# Patient Record
Sex: FEMALE | Race: WHITE | URBAN - METROPOLITAN AREA
[De-identification: names, ages, dates, MRNs, and addresses within clinical notes are randomized per-mention and may not be internally consistent; named-entity substitution may affect disease eponyms.]

---

## 2017-02-01 ENCOUNTER — OFFICE VISIT (OUTPATIENT)
Dept: FAMILY MEDICINE CLINIC | Facility: CLINIC | Age: 52
End: 2017-02-01

## 2017-02-01 VITALS
OXYGEN SATURATION: 96 % | WEIGHT: 247.19 LBS | DIASTOLIC BLOOD PRESSURE: 90 MMHG | RESPIRATION RATE: 20 BRPM | HEART RATE: 100 BPM | SYSTOLIC BLOOD PRESSURE: 142 MMHG | TEMPERATURE: 99 F | HEIGHT: 63 IN | BODY MASS INDEX: 43.8 KG/M2

## 2017-02-01 DIAGNOSIS — J40 BRONCHITIS: Primary | ICD-10-CM

## 2017-02-01 PROBLEM — Z98.890 H/O KNEE SURGERY: Status: ACTIVE | Noted: 2017-02-01

## 2017-02-01 PROCEDURE — 99214 OFFICE O/P EST MOD 30 MIN: CPT | Performed by: NURSE PRACTITIONER

## 2017-02-01 RX ORDER — REPAGLINIDE 0.5 MG/1
0.5 TABLET ORAL
COMMUNITY
Start: 2013-12-04 | End: 2017-07-28

## 2017-02-01 RX ORDER — AMOXICILLIN AND CLAVULANATE POTASSIUM 875; 125 MG/1; MG/1
1 TABLET, FILM COATED ORAL 2 TIMES DAILY
Qty: 20 TABLET | Refills: 0 | Status: SHIPPED | OUTPATIENT
Start: 2017-02-01 | End: 2017-02-11

## 2017-02-01 RX ORDER — TAMOXIFEN CITRATE 20 MG/1
20 TABLET ORAL DAILY
COMMUNITY
Start: 2013-05-29 | End: 2018-06-11

## 2017-02-01 RX ORDER — LISINOPRIL 10 MG/1
10 TABLET ORAL DAILY
COMMUNITY
Start: 2010-11-19 | End: 2017-07-28

## 2017-02-01 RX ORDER — BLOOD-GLUCOSE METER
EACH MISCELLANEOUS
COMMUNITY
Start: 2016-08-15

## 2017-02-01 RX ORDER — BENZONATATE 200 MG/1
200 CAPSULE ORAL 3 TIMES DAILY PRN
Qty: 30 CAPSULE | Refills: 1 | Status: SHIPPED | OUTPATIENT
Start: 2017-02-01 | End: 2017-06-07 | Stop reason: ALTCHOICE

## 2017-02-01 NOTE — PATIENT INSTRUCTIONS
Rest, increase fluids, salt water gargles ,neti pot (use distilled water) or saline nasal spray, Robitussin-DM, Tylenol/Ibuprofen, follow up if symptoms persist or increase.

## 2017-02-01 NOTE — PROGRESS NOTES
CHIEF COMPLAINT:   Patient presents with:  Wheezing: constant since 12/1/16      HPI:   Lin Leary is a 49year old female who presents to clinic today with complaints of cough- wheeze- since beginning of December   Mom was in the hospital came home tenderness on palpation of maxillary sinuses  EYES: conjunctiva clear  EARS:.  Small amount of serous fluid behind TMs bilaterally  NOSE: nostrils patent, mild congestion some dried blood Noted to left naris  THROAT: oral mucosa pink, moist. Posterior phar

## 2017-02-27 ENCOUNTER — TELEPHONE (OUTPATIENT)
Dept: FAMILY MEDICINE CLINIC | Facility: CLINIC | Age: 52
End: 2017-02-27

## 2017-02-27 ENCOUNTER — HOSPITAL ENCOUNTER (OUTPATIENT)
Dept: GENERAL RADIOLOGY | Age: 52
Discharge: HOME | End: 2017-02-27
Attending: NURSE PRACTITIONER
Payer: COMMERCIAL

## 2017-02-27 ENCOUNTER — OFFICE VISIT (OUTPATIENT)
Dept: FAMILY MEDICINE CLINIC | Facility: CLINIC | Age: 52
End: 2017-02-27

## 2017-02-27 VITALS
SYSTOLIC BLOOD PRESSURE: 126 MMHG | HEART RATE: 112 BPM | BODY MASS INDEX: 44 KG/M2 | WEIGHT: 246 LBS | DIASTOLIC BLOOD PRESSURE: 72 MMHG | OXYGEN SATURATION: 96 % | TEMPERATURE: 99 F

## 2017-02-27 DIAGNOSIS — J40 BRONCHITIS: ICD-10-CM

## 2017-02-27 DIAGNOSIS — J40 BRONCHITIS: Primary | ICD-10-CM

## 2017-02-27 DIAGNOSIS — R07.9 CHEST PAIN IN ADULT: ICD-10-CM

## 2017-02-27 PROCEDURE — 99214 OFFICE O/P EST MOD 30 MIN: CPT | Performed by: NURSE PRACTITIONER

## 2017-02-27 PROCEDURE — 71020 XR CHEST PA + LAT CHEST (CPT=71020): CPT

## 2017-02-27 PROCEDURE — 93000 ELECTROCARDIOGRAM COMPLETE: CPT | Performed by: NURSE PRACTITIONER

## 2017-02-27 RX ORDER — SULFAMETHOXAZOLE AND TRIMETHOPRIM 800; 160 MG/1; MG/1
1 TABLET ORAL 2 TIMES DAILY
Qty: 20 TABLET | Refills: 0 | Status: SHIPPED | OUTPATIENT
Start: 2017-02-27 | End: 2017-03-08

## 2017-02-27 NOTE — PATIENT INSTRUCTIONS
Rest, increase fluids, salt water gargles ,neti pot (use distilled water) or saline nasal spray, Mucinex, Alavert, start Levaquin daily x 10 days. Tylenol/Ibuprofen, follow up if symptoms persist or increase.

## 2017-02-27 NOTE — PROGRESS NOTES
CHIEF COMPLAINT:   Patient presents with:  Cough  Nasal Congestion  Sinusitis  Wheezing      HPI:   Shaila Mckeon is a 49year old female who presents to clinic today with complaints of cough- took Augmentin- 2/2- tessalon pearls- didn't help- states s edema  GI: No N/V/C/D.   NEURO: denies complaints    EXAM:   /72 mmHg  Pulse 112  Temp(Src) 99.2 °F (37.3 °C) (Tympanic)  Wt 246 lb  SpO2 96%  GENERAL: well developed, well nourished,in no apparent distress  SKIN: no rashes,no suspicious lesions  HEAD

## 2017-02-27 NOTE — TELEPHONE ENCOUNTER
----- Message from CECILIO Gonzalez sent at 2/27/2017  3:12 PM CST -----  Please notify patient  That the radiologist agrees that there is a suggestion of patchy right middle lobe pneumonia- patient  To continue antibiotic as discussed- follow up if sy

## 2017-03-08 ENCOUNTER — TELEPHONE (OUTPATIENT)
Dept: FAMILY MEDICINE CLINIC | Facility: CLINIC | Age: 52
End: 2017-03-08

## 2017-03-08 RX ORDER — SULFAMETHOXAZOLE AND TRIMETHOPRIM 800; 160 MG/1; MG/1
1 TABLET ORAL 2 TIMES DAILY
Qty: 8 TABLET | Refills: 0 | Status: SHIPPED | OUTPATIENT
Start: 2017-03-08 | End: 2017-03-12

## 2017-03-08 NOTE — TELEPHONE ENCOUNTER
She really should come back in for reevaluation. I will extend the Bactrim for 4 more days, but if she is not getting better she needs an appointment.   prescription  Sent to [facility] in [facility]

## 2017-03-08 NOTE — TELEPHONE ENCOUNTER
Patient notified bactrim sent to [facility], notified if not getting better needs to come in for appt. Patient expressed understanding and thanks.

## 2017-03-29 ENCOUNTER — TELEPHONE (OUTPATIENT)
Dept: FAMILY MEDICINE CLINIC | Facility: CLINIC | Age: 52
End: 2017-03-29

## 2017-03-29 NOTE — TELEPHONE ENCOUNTER
Replacements to Victoza:  Bydureon/Byetta/Trulicity. Please advise.   Golden Allen, 03/29/2017, 10:31 AM

## 2017-03-29 NOTE — TELEPHONE ENCOUNTER
Halina Ayala has use Byetta and Bydureon and did not do as well with them. Will try Truclicity. New Rx sent in. Finish current Victoza before starting Trulicity.

## 2017-04-03 ENCOUNTER — TELEPHONE (OUTPATIENT)
Dept: FAMILY MEDICINE CLINIC | Facility: CLINIC | Age: 52
End: 2017-04-03

## 2017-04-03 NOTE — TELEPHONE ENCOUNTER
Rx for Mlyaicity should have gone to [facility]. Rx had been sent to [facility]. Verbal given to Pharmacist at [facility] for 90 day Rx.   Hany Palomo, 04/03/2017, 2:44 PM

## 2017-05-30 ENCOUNTER — TELEPHONE (OUTPATIENT)
Dept: FAMILY MEDICINE CLINIC | Facility: CLINIC | Age: 52
End: 2017-05-30

## 2017-05-30 DIAGNOSIS — E11.9 CONTROLLED TYPE 2 DIABETES MELLITUS WITHOUT COMPLICATION, WITHOUT LONG-TERM CURRENT USE OF INSULIN (HCC): Primary | ICD-10-CM

## 2017-05-30 NOTE — TELEPHONE ENCOUNTER
----- Message from Hermelinda Wiseman sent at 5/30/2017 11:14 AM CDT -----  Regarding: lab orders needed   Patient has lab appointment on 6/2/17 could you please put lab orders in system.          Thanks,  Alondra

## 2017-06-02 ENCOUNTER — APPOINTMENT (OUTPATIENT)
Dept: LAB | Age: 52
End: 2017-06-02
Attending: FAMILY MEDICINE
Payer: COMMERCIAL

## 2017-06-02 DIAGNOSIS — E11.9 CONTROLLED TYPE 2 DIABETES MELLITUS WITHOUT COMPLICATION, WITHOUT LONG-TERM CURRENT USE OF INSULIN (HCC): ICD-10-CM

## 2017-06-02 PROCEDURE — 83036 HEMOGLOBIN GLYCOSYLATED A1C: CPT | Performed by: FAMILY MEDICINE

## 2017-06-02 PROCEDURE — 36415 COLL VENOUS BLD VENIPUNCTURE: CPT | Performed by: FAMILY MEDICINE

## 2017-06-02 PROCEDURE — 80053 COMPREHEN METABOLIC PANEL: CPT | Performed by: FAMILY MEDICINE

## 2017-06-05 ENCOUNTER — MED REC SCAN ONLY (OUTPATIENT)
Dept: FAMILY MEDICINE CLINIC | Facility: CLINIC | Age: 52
End: 2017-06-05

## 2017-06-07 ENCOUNTER — OFFICE VISIT (OUTPATIENT)
Dept: FAMILY MEDICINE CLINIC | Facility: CLINIC | Age: 52
End: 2017-06-07

## 2017-06-07 VITALS
RESPIRATION RATE: 16 BRPM | HEIGHT: 62 IN | TEMPERATURE: 98 F | WEIGHT: 247.13 LBS | DIASTOLIC BLOOD PRESSURE: 80 MMHG | SYSTOLIC BLOOD PRESSURE: 104 MMHG | HEART RATE: 80 BPM | BODY MASS INDEX: 45.48 KG/M2

## 2017-06-07 DIAGNOSIS — E66.01 MORBID OBESITY DUE TO EXCESS CALORIES (HCC): ICD-10-CM

## 2017-06-07 DIAGNOSIS — IMO0001 UNCONTROLLED TYPE 2 DIABETES MELLITUS WITHOUT COMPLICATION, WITHOUT LONG-TERM CURRENT USE OF INSULIN: Primary | ICD-10-CM

## 2017-06-07 DIAGNOSIS — C50.412 MALIGNANT NEOPLASM OF UPPER-OUTER QUADRANT OF LEFT BREAST IN FEMALE, ESTROGEN RECEPTOR POSITIVE (HCC): ICD-10-CM

## 2017-06-07 DIAGNOSIS — Z17.0 MALIGNANT NEOPLASM OF UPPER-OUTER QUADRANT OF LEFT BREAST IN FEMALE, ESTROGEN RECEPTOR POSITIVE (HCC): ICD-10-CM

## 2017-06-07 DIAGNOSIS — M15.9 PRIMARY OSTEOARTHRITIS INVOLVING MULTIPLE JOINTS: ICD-10-CM

## 2017-06-07 DIAGNOSIS — I10 BENIGN ESSENTIAL HYPERTENSION: ICD-10-CM

## 2017-06-07 PROBLEM — Z98.890 HISTORY OF DILATION AND CURETTAGE: Status: ACTIVE | Noted: 2017-06-07

## 2017-06-07 PROCEDURE — 99214 OFFICE O/P EST MOD 30 MIN: CPT | Performed by: FAMILY MEDICINE

## 2017-06-07 RX ORDER — ASPIRIN 81 MG/1
81 TABLET, CHEWABLE ORAL DAILY
COMMUNITY
End: 2018-07-18 | Stop reason: ALTCHOICE

## 2017-06-07 RX ORDER — CLOTRIMAZOLE AND BETAMETHASONE DIPROPIONATE 10; .64 MG/G; MG/G
1 CREAM TOPICAL 2 TIMES DAILY PRN
Refills: 2 | COMMUNITY
Start: 2017-05-25 | End: 2018-11-30

## 2017-06-07 NOTE — PATIENT INSTRUCTIONS
Continue the same medications. Continue to work on slow steady weight loss. Please schedule colonoscopy.

## 2017-06-07 NOTE — PROGRESS NOTES
[facility] [facility] [place]  PROGRESS NOTE  Chief Complaint:   Patient presents with:  Diabetes: Problems with Trulicity  Follow - Up      HPI:   This is a 49year old female coming in for follow-up of her diabetes. She is a little discouraged.   The t • Cancer Father    • Heart Disorder Mother      Allergies:    No Known Allergies        Current Meds:    Current Outpatient Prescriptions:  clotrimazole-betamethasone 1-0.05 % External Cream Apply 1 Application topically 2 (two) times daily as needed.  Blade Pakrer vomiting, constipation, diarrhea, or blood in stool. MUSCULOSKELETAL:  Denies weakness, muscle aches, back pain, joint pain, swelling or stiffness.   NEUROLOGICAL:  Denies headache, seizures, dizziness, syncope, paralysis, ataxia, numbness or tingling in t pedis pulses bilaterally. NEURO:  No deficit, normal gait, strength and tone, sensory intact.   Bilateral barefoot skin diabetic exam is normal, visualized feet and the appearance is normal.  Bilateral monofilament/sensation of both feet is normal.  Pulsat Patient/Caregiver Education: Patient/Caregiver Education: There are no barriers to learning. Medical education done. Outcome: Patient verbalizes understanding.  Patient is notified to call with any questions, complications, allergies, or worsening o

## 2017-07-28 RX ORDER — LISINOPRIL 10 MG/1
TABLET ORAL
Qty: 90 TABLET | Refills: 3 | Status: SHIPPED | OUTPATIENT
Start: 2017-07-28 | End: 2018-08-08

## 2017-07-28 RX ORDER — REPAGLINIDE 0.5 MG/1
TABLET ORAL
Qty: 270 TABLET | Refills: 3 | Status: SHIPPED | OUTPATIENT
Start: 2017-07-28 | End: 2018-06-11

## 2017-07-28 NOTE — TELEPHONE ENCOUNTER
Last office visit 6/7/17  Last blood work 6/2/17    Future Appointments  Date Time Provider Sheila Booker   12/4/2017 9:15 AM [facility] [place] [facility]   12/8/2017 9:30 AM Boni Bailey MD [facility] [place] [facility] [place]

## 2017-11-22 ENCOUNTER — OFFICE VISIT (OUTPATIENT)
Dept: FAMILY MEDICINE CLINIC | Facility: CLINIC | Age: 52
End: 2017-11-22

## 2017-11-22 VITALS
TEMPERATURE: 98 F | DIASTOLIC BLOOD PRESSURE: 86 MMHG | OXYGEN SATURATION: 97 % | HEIGHT: 62 IN | WEIGHT: 244.81 LBS | BODY MASS INDEX: 45.05 KG/M2 | HEART RATE: 80 BPM | SYSTOLIC BLOOD PRESSURE: 104 MMHG | RESPIRATION RATE: 12 BRPM

## 2017-11-22 DIAGNOSIS — IMO0001 UNCONTROLLED TYPE 2 DIABETES MELLITUS WITHOUT COMPLICATION, WITHOUT LONG-TERM CURRENT USE OF INSULIN: ICD-10-CM

## 2017-11-22 DIAGNOSIS — J20.9 BRONCHITIS WITH BRONCHOSPASM: Primary | ICD-10-CM

## 2017-11-22 PROCEDURE — 99213 OFFICE O/P EST LOW 20 MIN: CPT | Performed by: NURSE PRACTITIONER

## 2017-11-22 RX ORDER — ALBUTEROL SULFATE 90 UG/1
2 AEROSOL, METERED RESPIRATORY (INHALATION) EVERY 4 HOURS PRN
Qty: 1 INHALER | Refills: 6 | Status: SHIPPED | OUTPATIENT
Start: 2017-11-22 | End: 2018-06-11

## 2017-11-22 RX ORDER — AMOXICILLIN AND CLAVULANATE POTASSIUM 875; 125 MG/1; MG/1
1 TABLET, FILM COATED ORAL 2 TIMES DAILY
Qty: 20 TABLET | Refills: 0 | Status: SHIPPED | OUTPATIENT
Start: 2017-11-22 | End: 2017-12-02

## 2017-11-22 NOTE — PROGRESS NOTES
HPI:    Patient ID: Davida Martniez is a 47year old female. HPI     Cough for about 3 weeks. Had pneumonia at the beginning of the year and has lingering cough. Much worse the past 3 weeks. Cough till throws up at times.    Taking Robitussin DM helps te apply Kit  Disp:  Rfl:    Glucose Blood (ONETOUCH ULTRA BLUE) In Vitro Strip 2 each by Finger stick route daily. Disp:  Rfl:    Insulin Pen Needle (BD PEN NEEDLE SHORT U/F) 31G X 8 MM Does not apply Misc Inject 1 each into the skin once a week.    Disp: This Visit:  Signed Prescriptions Disp Refills    Amoxicillin-Pot Clavulanate 875-125 MG Oral Tab 20 tablet 0      Sig: Take 1 tablet by mouth 2 (two) times daily.       Albuterol Sulfate HFA (PROAIR HFA) 108 (90 Base) MCG/ACT Inhalation Aero Soln 1 Inhaler

## 2017-11-22 NOTE — PATIENT INSTRUCTIONS
Directed to take antibiotics until gone. Recommend to eat yogurt or take probiotic daily while on antibiotic. Use inhaler as directed - rinse mouth after using. Treat symptoms as needed. Return to clinic if not better in 48-72 hours.   Otherwise jennifer

## 2017-12-04 ENCOUNTER — LABORATORY ENCOUNTER (OUTPATIENT)
Dept: LAB | Age: 52
End: 2017-12-04
Attending: FAMILY MEDICINE
Payer: COMMERCIAL

## 2017-12-04 DIAGNOSIS — E66.01 MORBID OBESITY DUE TO EXCESS CALORIES (HCC): ICD-10-CM

## 2017-12-04 DIAGNOSIS — C50.412 MALIGNANT NEOPLASM OF UPPER-OUTER QUADRANT OF LEFT BREAST IN FEMALE, ESTROGEN RECEPTOR POSITIVE (HCC): ICD-10-CM

## 2017-12-04 DIAGNOSIS — I10 BENIGN ESSENTIAL HYPERTENSION: ICD-10-CM

## 2017-12-04 DIAGNOSIS — Z17.0 MALIGNANT NEOPLASM OF UPPER-OUTER QUADRANT OF LEFT BREAST IN FEMALE, ESTROGEN RECEPTOR POSITIVE (HCC): ICD-10-CM

## 2017-12-04 DIAGNOSIS — IMO0001 UNCONTROLLED TYPE 2 DIABETES MELLITUS WITHOUT COMPLICATION, WITHOUT LONG-TERM CURRENT USE OF INSULIN: ICD-10-CM

## 2017-12-04 DIAGNOSIS — M15.9 PRIMARY OSTEOARTHRITIS INVOLVING MULTIPLE JOINTS: ICD-10-CM

## 2017-12-04 PROCEDURE — 80050 GENERAL HEALTH PANEL: CPT | Performed by: FAMILY MEDICINE

## 2017-12-04 PROCEDURE — 83036 HEMOGLOBIN GLYCOSYLATED A1C: CPT | Performed by: FAMILY MEDICINE

## 2017-12-04 PROCEDURE — 36415 COLL VENOUS BLD VENIPUNCTURE: CPT | Performed by: FAMILY MEDICINE

## 2017-12-04 PROCEDURE — 82570 ASSAY OF URINE CREATININE: CPT | Performed by: FAMILY MEDICINE

## 2017-12-04 PROCEDURE — 82043 UR ALBUMIN QUANTITATIVE: CPT | Performed by: FAMILY MEDICINE

## 2017-12-04 PROCEDURE — 84550 ASSAY OF BLOOD/URIC ACID: CPT | Performed by: FAMILY MEDICINE

## 2017-12-04 PROCEDURE — 80061 LIPID PANEL: CPT | Performed by: FAMILY MEDICINE

## 2017-12-08 ENCOUNTER — OFFICE VISIT (OUTPATIENT)
Dept: FAMILY MEDICINE CLINIC | Facility: CLINIC | Age: 52
End: 2017-12-08

## 2017-12-08 VITALS
RESPIRATION RATE: 16 BRPM | DIASTOLIC BLOOD PRESSURE: 86 MMHG | WEIGHT: 238 LBS | SYSTOLIC BLOOD PRESSURE: 124 MMHG | HEART RATE: 86 BPM | TEMPERATURE: 99 F | BODY MASS INDEX: 42.17 KG/M2 | HEIGHT: 63 IN

## 2017-12-08 DIAGNOSIS — IMO0001 UNCONTROLLED TYPE 2 DIABETES MELLITUS WITHOUT COMPLICATION, WITHOUT LONG-TERM CURRENT USE OF INSULIN: Primary | ICD-10-CM

## 2017-12-08 DIAGNOSIS — C50.412 MALIGNANT NEOPLASM OF UPPER-OUTER QUADRANT OF LEFT BREAST IN FEMALE, ESTROGEN RECEPTOR POSITIVE (HCC): ICD-10-CM

## 2017-12-08 DIAGNOSIS — I10 BENIGN ESSENTIAL HYPERTENSION: ICD-10-CM

## 2017-12-08 DIAGNOSIS — Z17.0 MALIGNANT NEOPLASM OF UPPER-OUTER QUADRANT OF LEFT BREAST IN FEMALE, ESTROGEN RECEPTOR POSITIVE (HCC): ICD-10-CM

## 2017-12-08 DIAGNOSIS — M15.9 PRIMARY OSTEOARTHRITIS INVOLVING MULTIPLE JOINTS: ICD-10-CM

## 2017-12-08 DIAGNOSIS — I15.2 HYPERTENSION ASSOCIATED WITH DIABETES (HCC): ICD-10-CM

## 2017-12-08 DIAGNOSIS — E11.59 HYPERTENSION ASSOCIATED WITH DIABETES (HCC): ICD-10-CM

## 2017-12-08 PROCEDURE — 99214 OFFICE O/P EST MOD 30 MIN: CPT | Performed by: FAMILY MEDICINE

## 2017-12-08 RX ORDER — GLIMEPIRIDE 1 MG/1
1 TABLET ORAL
Qty: 30 TABLET | Refills: 5 | Status: SHIPPED | OUTPATIENT
Start: 2017-12-08 | End: 2018-01-25

## 2017-12-08 RX ORDER — GUAIFENESIN/DEXTROMETHORPHAN 100-10MG/5
SYRUP ORAL AS NEEDED
COMMUNITY
End: 2018-06-11

## 2017-12-08 NOTE — PROGRESS NOTES
[address]  PROGRESS NOTE  Chief Complaint:   Patient presents with:   Follow - Up      HPI:   This is a 47year old female coming in for ***      Results for orders placed or performed in visit on 74/94/05  -COMP METABOLIC PANEL (14)   R 0. 50 0.10 - 0.60 x10(3) uL   Eosinophil Absolute 0.22 0.00 - 0.30 x10(3) uL   Basophil Absolute 0.03 0.00 - 0.10 x10(3) uL   Immature Granulocyte Absolute 0.02 0.00 - 1.00 x10(3) uL   Neutrophil % 68.0 %   Lymphocyte % 22.9 %   Monocyte % 5.9 %   Eosinophi (WOMENS 50+ MULTI VITAMIN/MIN) Oral Tab Take 1 tablet by mouth daily. Disp:  Rfl:    Dulaglutide (TRULICITY) 1.5 QU/4.7QO Subcutaneous Solution Pen-injector Inject 1.5 mg into the skin once a week.  Disp: 13 pen Rfl: 3   Blood Glucose Monitoring Suppl (ONET 124/86 (BP Location: Right arm, Patient Position: Sitting, Cuff Size: large)   Pulse 86   Temp 98.5 °F (36.9 °C) (Tympanic)   Resp 16   Ht 63\"   Wt 238 lb   BMI 42.16 kg/m²  Estimated body mass index is 42.16 kg/m² as calculated from the following:    Hei Education: Patient/Caregiver Education: There are no barriers to learning. Medical education done. Outcome: Patient verbalizes understanding. Patient is notified to call with any questions, complications, allergies, or worsening or changing symptoms.   Pa

## 2017-12-08 NOTE — PROGRESS NOTES
[address]  PROGRESS NOTE  Chief Complaint:   Patient presents with: Follow - Up      HPI:   This is a 47year old female coming in for follow-up on her diabetes. The Trulicity that she is taking makes her somewhat nauseated.   She ha mg/dL   -MICROALB/CREAT RATIO, RANDOM URINE   Result Value Ref Range   Microalbumin, Urine 2.48 mg/dL   Creatinine Ur Random 176.00 mg/dL   Malb/Cre Calc 14.1 <=30.0 ug/mg   -ASSAY, THYROID STIM HORMONE   Result Value Ref Range   TSH 2.550 0.350 - 5.500 mI Known Allergies        Current Meds:    Current Outpatient Prescriptions:  Guaifenesin--10 MG/5ML Oral Syrup Take by mouth as needed.  Disp:  Rfl:    glimepiride (AMARYL) 1 MG Oral Tab Take 1 tablet (1 mg total) by mouth daily with breakfast. Disp: 30 lesion, or excessive skin dryness. CARDIOVASCULAR:  Denies chest pain, chest pressure, chest discomfort, palpitations, edema, dyspnea on exertion or at rest.  RESPIRATORY:  Denies shortness of breath, wheezing, cough or sputum.   GASTROINTESTINAL:  Denies turgor. HEART:  Regular rate and rhythm, no murmurs, rubs or gallops. LUNGS: Clear to auscultation bilterally, no rales/rhonchi/wheezing. ABDOMEN:  Soft, nondistended, nontender, bowel sounds normal in all 4 quadrants, no masses, no hepatosplenomegaly. tablet 5      Sig: Take 1 tablet (1 mg total) by mouth daily with breakfast.         Patient/Caregiver Education: Patient/Caregiver Education: There are no barriers to learning. Medical education done. Outcome: Patient verbalizes understanding.  Patient i

## 2018-01-25 ENCOUNTER — TELEPHONE (OUTPATIENT)
Dept: FAMILY MEDICINE CLINIC | Facility: CLINIC | Age: 53
End: 2018-01-25

## 2018-01-25 ENCOUNTER — OFFICE VISIT (OUTPATIENT)
Dept: FAMILY MEDICINE CLINIC | Facility: CLINIC | Age: 53
End: 2018-01-25

## 2018-01-25 ENCOUNTER — HOSPITAL ENCOUNTER (OUTPATIENT)
Dept: GENERAL RADIOLOGY | Age: 53
Discharge: HOME | End: 2018-01-25
Attending: FAMILY MEDICINE
Payer: COMMERCIAL

## 2018-01-25 VITALS
RESPIRATION RATE: 16 BRPM | SYSTOLIC BLOOD PRESSURE: 134 MMHG | DIASTOLIC BLOOD PRESSURE: 80 MMHG | BODY MASS INDEX: 41.61 KG/M2 | TEMPERATURE: 98 F | HEIGHT: 63 IN | HEART RATE: 80 BPM | WEIGHT: 234.81 LBS

## 2018-01-25 DIAGNOSIS — C50.412 MALIGNANT NEOPLASM OF UPPER-OUTER QUADRANT OF LEFT FEMALE BREAST, UNSPECIFIED ESTROGEN RECEPTOR STATUS (HCC): ICD-10-CM

## 2018-01-25 DIAGNOSIS — IMO0001 UNCONTROLLED TYPE 2 DIABETES MELLITUS WITHOUT COMPLICATION, WITHOUT LONG-TERM CURRENT USE OF INSULIN: ICD-10-CM

## 2018-01-25 DIAGNOSIS — Z01.818 PREOPERATIVE EXAMINATION: ICD-10-CM

## 2018-01-25 DIAGNOSIS — I10 BENIGN ESSENTIAL HYPERTENSION: ICD-10-CM

## 2018-01-25 DIAGNOSIS — J90 RECURRENT PLEURAL EFFUSION ON RIGHT: ICD-10-CM

## 2018-01-25 DIAGNOSIS — Z01.818 PREOPERATIVE EXAMINATION: Primary | ICD-10-CM

## 2018-01-25 PROCEDURE — 93000 ELECTROCARDIOGRAM COMPLETE: CPT | Performed by: FAMILY MEDICINE

## 2018-01-25 PROCEDURE — 71046 X-RAY EXAM CHEST 2 VIEWS: CPT | Performed by: FAMILY MEDICINE

## 2018-01-25 PROCEDURE — 99244 OFF/OP CNSLTJ NEW/EST MOD 40: CPT | Performed by: FAMILY MEDICINE

## 2018-01-25 RX ORDER — LETROZOLE 2.5 MG/1
2.5 TABLET, FILM COATED ORAL
COMMUNITY
Start: 2018-01-12 | End: 2018-11-30

## 2018-01-25 NOTE — PATIENT INSTRUCTIONS
Dr Radha Epps and Dr Ary Garcia notified that patient has R pleural effusion that would need to be drained and likely would need to reschedule procedure. Patient had thoracentesis few weeks ago. It was set up by Dr Evelyn Narvaez office last time.    Patient requested that

## 2018-01-25 NOTE — PROGRESS NOTES
[facility]  PRE[facility]    Chief Complaint:   Patient presents with:  Pre-Op Exam: Removal of ovaries on 1/26/18 with Dr. Ny Rubalcava      HPI:   Tristin Ogden is a 47year old female with a hx of L breast cancer, diabetes an Range   WBC 8.4 4.0 - 13.0 x10(3) uL   RBC 4.68 3.80 - 5.10 x10(6)uL   HGB 13.2 12.0 - 16.0 g/dL   HCT 43.3 34.0 - 50.0 %   .0 150.0 - 450.0 10(3)uL   MCV 92.5 81.0 - 100.0 fL   MCH 28.2 27.0 - 33.2 pg   MCHC 30.5 (L) 31.0 - 37.0 g/dL   RDW 15.0 11. needed for Wheezing.  Disp: 1 Inhaler Rfl: 6   REPAGLINIDE 0.5 MG Oral Tab TAKE 1 TABLET THREE TIMES A DAY BEFORE MEALS Disp: 270 tablet Rfl: 3   LISINOPRIL 10 MG Oral Tab TAKE 1 TABLET DAILY Disp: 90 tablet Rfl: 3   MetFORMIN HCl 1000 MG Oral Tab Take 1 ta Denies abdominal pain, nausea, vomiting, constipation, diarrhea, or blood in stool. MUSCULOSKELETAL:  Denies weakness, muscle aches, back pain, joint pain, swelling or stiffness.   NEUROLOGICAL:  Denies headache, seizures, dizziness, syncope, paralysis, at hepatosplenomegaly. MUSCULOSKELETAL: Normal ROM, no joint pain, or muscle weakness in all extremity. BACK: No tenderness, no spasm, SLR test negative, FROM. EXTREMITIES:  No edema, no cyanosis, no clubbing, FROM, 2+ dorsalis pedis pulses bilaterally. 11/10/2016  Colonoscopy,10 Years due on 11/10/2016    Patient/Caregiver Education: Patient/Caregiver Education: There are no barriers to learning. Medical education done. Outcome: Patient verbalizes understanding.  Patient is notified to call with any que

## 2018-01-26 ENCOUNTER — TELEPHONE (OUTPATIENT)
Dept: FAMILY MEDICINE CLINIC | Facility: CLINIC | Age: 53
End: 2018-01-26

## 2018-01-26 DIAGNOSIS — J90 RECURRENT PLEURAL EFFUSION ON RIGHT: Primary | ICD-10-CM

## 2018-01-26 DIAGNOSIS — Z98.890 S/P THORACENTESIS: ICD-10-CM

## 2018-01-26 NOTE — TELEPHONE ENCOUNTER
Patient is scheduled for a Thoracentesis this pm and the surgery is scheduled for Monday. Dr Kurtis Serrano is calling asking if she needs to be seen before Monday?

## 2018-01-26 NOTE — TELEPHONE ENCOUNTER
Patient states She had 1 liter of fluid removed today from lung. Is having Surgery on Monday. Asking if CXR done after procedure today is good enough to clear Her for Surgery on Monday. Per Dr Charles Chaudhry needing CXR done on Sunday 1/28 at [facility].

## 2018-01-26 NOTE — TELEPHONE ENCOUNTER
Pt was seen for an H&P regarding upcoming surgery. Pt was seen by Dr Nicolas Christina. Pt had thoracentesis done today and needs addendum.  Fax number [phone]

## 2018-01-26 NOTE — TELEPHONE ENCOUNTER
Patient informed per Dr Saba Mtz. CXR to be done at [facility] on Sunday Pre Surgical.  Informed results will be available for Surgeon to view and determine   If Surgery still to be done on Monday/agreed.   Charles Vargas, 01/26/18, 4:50 PM

## 2018-01-29 ENCOUNTER — TELEPHONE (OUTPATIENT)
Dept: FAMILY MEDICINE CLINIC | Facility: CLINIC | Age: 53
End: 2018-01-29

## 2018-01-29 DIAGNOSIS — J18.9 PNEUMONIA OF RIGHT LOWER LOBE DUE TO INFECTIOUS ORGANISM: ICD-10-CM

## 2018-01-29 DIAGNOSIS — J90 RECURRENT PLEURAL EFFUSION ON RIGHT: Primary | ICD-10-CM

## 2018-01-29 DIAGNOSIS — Z17.0 MALIGNANT NEOPLASM OF UPPER-OUTER QUADRANT OF LEFT BREAST IN FEMALE, ESTROGEN RECEPTOR POSITIVE (HCC): ICD-10-CM

## 2018-01-29 DIAGNOSIS — C50.412 MALIGNANT NEOPLASM OF UPPER-OUTER QUADRANT OF LEFT BREAST IN FEMALE, ESTROGEN RECEPTOR POSITIVE (HCC): ICD-10-CM

## 2018-01-29 RX ORDER — AZITHROMYCIN 250 MG/1
TABLET, FILM COATED ORAL
Qty: 6 TABLET | Refills: 0 | Status: SHIPPED | OUTPATIENT
Start: 2018-01-29 | End: 2018-06-11 | Stop reason: ALTCHOICE

## 2018-01-29 RX ORDER — LEVOFLOXACIN 750 MG/1
750 TABLET ORAL DAILY
Qty: 7 TABLET | Refills: 0 | Status: SHIPPED | OUTPATIENT
Start: 2018-01-29 | End: 2018-01-29

## 2018-01-29 NOTE — TELEPHONE ENCOUNTER
Please inform patient that lung x-ray shows that she has pneumonia. Recommend to start on Levaquin 750 mg once a day for 7 days. Also recommend to follow-up with pulmonologist Dr. Isaura Pina.    Also recommend to follow-up with Dr. Trent Santoyo or me in 10-14 day

## 2018-01-29 NOTE — TELEPHONE ENCOUNTER
Patient is wondering if there is another medication she can take besides Levaquin? Patient states that she has been told that isnt the best medication to take. Dr Taylor Diaz can you please advise.

## 2018-01-29 NOTE — TELEPHONE ENCOUNTER
I have sent azithromycin for 5 days. Recommend to return to clinic if any concern. Also follow up with pulmonologist as soon as possible. Follow up with us in 7-10 days.

## 2018-01-29 NOTE — TELEPHONE ENCOUNTER
Let pt know the following below. Pt verbalized her understanding and had no other questions at this time.    Future Appointments  Date Time Provider Sheila Booker   2/9/2018 9:00 AM Vaibhav Swain MD [facility] [place] [facility]

## 2018-02-02 ENCOUNTER — TELEPHONE (OUTPATIENT)
Dept: FAMILY MEDICINE CLINIC | Facility: CLINIC | Age: 53
End: 2018-02-02

## 2018-02-02 NOTE — TELEPHONE ENCOUNTER
Dr Olivia Miles office is calling stating that the patient is scheduled for surgery on Monday and they are needing an addendum stating that she is cleared. Patient saw Dr Joey Jacobsen the Pulmonologist for review of her chest xray.  Nurse is sending over office notes

## 2018-02-03 NOTE — TELEPHONE ENCOUNTER
VANESSA Guardado   She did get the fax       Sharron @ [address] [place] [address], 02/02/18, 3:00 PM

## 2018-02-14 RX ORDER — DULAGLUTIDE 1.5 MG/.5ML
INJECTION, SOLUTION SUBCUTANEOUS
Qty: 6 ML | Refills: 3 | Status: SHIPPED | OUTPATIENT
Start: 2018-02-14 | End: 2018-12-11

## 2018-02-14 NOTE — TELEPHONE ENCOUNTER
Future appt:  None   Last Appointment:  12/8/2017; Return in about 6 months (around 6/8/2018).        Cholesterol, Total (mg/dL)   Date Value   12/04/2017 132   ----------  HDL Cholesterol (mg/dL)   Date Value   12/04/2017 50   ----------  LDL Cholesterol (

## 2018-04-07 ENCOUNTER — TELEPHONE (OUTPATIENT)
Dept: FAMILY MEDICINE CLINIC | Facility: CLINIC | Age: 53
End: 2018-04-07

## 2018-04-07 DIAGNOSIS — IMO0001 UNCONTROLLED TYPE 2 DIABETES MELLITUS WITHOUT COMPLICATION, WITHOUT LONG-TERM CURRENT USE OF INSULIN: Primary | ICD-10-CM

## 2018-04-07 NOTE — TELEPHONE ENCOUNTER
bloodwork question- has appointment to have some drawn per her oncologist on 06-05, dr Jesus Plascencia want to add to it?

## 2018-04-07 NOTE — TELEPHONE ENCOUNTER
Pt having blood work done in June with oncologist for calcium. Would Dr. Lesvia Valencia like to add any labs since pt will be drawn in June. Please advise.

## 2018-04-11 ENCOUNTER — TELEPHONE (OUTPATIENT)
Dept: FAMILY MEDICINE CLINIC | Facility: CLINIC | Age: 53
End: 2018-04-11

## 2018-04-11 NOTE — TELEPHONE ENCOUNTER
is requesting that the [facility] is [facility] blood[facility] done- did not know fax #

## 2018-04-11 NOTE — TELEPHONE ENCOUNTER
Faxed to [address] [place] [address] Patient Scheduling [phone]. Patient informed.   Ludmila Smith, 04/11/18, 4:38 PM

## 2018-04-12 ENCOUNTER — TELEPHONE (OUTPATIENT)
Dept: FAMILY MEDICINE CLINIC | Facility: CLINIC | Age: 53
End: 2018-04-12

## 2018-06-11 ENCOUNTER — OFFICE VISIT (OUTPATIENT)
Dept: FAMILY MEDICINE CLINIC | Facility: CLINIC | Age: 53
End: 2018-06-11

## 2018-06-11 VITALS
RESPIRATION RATE: 16 BRPM | TEMPERATURE: 99 F | BODY MASS INDEX: 42.04 KG/M2 | HEART RATE: 94 BPM | WEIGHT: 237.25 LBS | DIASTOLIC BLOOD PRESSURE: 78 MMHG | SYSTOLIC BLOOD PRESSURE: 116 MMHG | HEIGHT: 63 IN

## 2018-06-11 DIAGNOSIS — I10 BENIGN ESSENTIAL HYPERTENSION: ICD-10-CM

## 2018-06-11 DIAGNOSIS — C50.912 RECURRENT BREAST CANCER, LEFT (HCC): ICD-10-CM

## 2018-06-11 DIAGNOSIS — IMO0001 UNCONTROLLED TYPE 2 DIABETES MELLITUS WITHOUT COMPLICATION, WITHOUT LONG-TERM CURRENT USE OF INSULIN: Primary | ICD-10-CM

## 2018-06-11 DIAGNOSIS — C79.51 SECONDARY MALIGNANT NEOPLASM OF BONE (HCC): ICD-10-CM

## 2018-06-11 DIAGNOSIS — C50.412 MALIGNANT NEOPLASM OF UPPER-OUTER QUADRANT OF LEFT FEMALE BREAST, UNSPECIFIED ESTROGEN RECEPTOR STATUS (HCC): ICD-10-CM

## 2018-06-11 DIAGNOSIS — C78.01 MALIGNANT NEOPLASM METASTATIC TO RIGHT LUNG (HCC): ICD-10-CM

## 2018-06-11 PROBLEM — J90 PLEURAL EFFUSION, RIGHT: Status: ACTIVE | Noted: 2018-01-26

## 2018-06-11 PROBLEM — B37.31 YEAST VAGINITIS: Status: ACTIVE | Noted: 2018-02-20

## 2018-06-11 PROBLEM — B37.3 YEAST VAGINITIS: Status: ACTIVE | Noted: 2018-02-20

## 2018-06-11 PROBLEM — J91.0: Status: ACTIVE | Noted: 2018-02-01

## 2018-06-11 PROBLEM — J91.0 PLEURAL EFFUSION, MALIGNANT: Status: ACTIVE | Noted: 2018-02-01

## 2018-06-11 PROBLEM — M25.552 HIP PAIN, CHRONIC, LEFT: Status: ACTIVE | Noted: 2018-05-10

## 2018-06-11 PROBLEM — F40.240 CLAUSTROPHOBIA: Status: ACTIVE | Noted: 2018-05-29

## 2018-06-11 PROBLEM — G89.29 HIP PAIN, CHRONIC, LEFT: Status: ACTIVE | Noted: 2018-05-10

## 2018-06-11 PROBLEM — C79.2 METASTASIS TO SKIN (HCC): Status: ACTIVE | Noted: 2017-12-27

## 2018-06-11 PROCEDURE — 99214 OFFICE O/P EST MOD 30 MIN: CPT | Performed by: FAMILY MEDICINE

## 2018-06-11 RX ORDER — PROCHLORPERAZINE MALEATE 10 MG
10 TABLET ORAL EVERY 6 HOURS PRN
COMMUNITY
Start: 2018-05-18

## 2018-06-11 RX ORDER — ONDANSETRON 8 MG/1
8 TABLET, ORALLY DISINTEGRATING ORAL EVERY 8 HOURS PRN
COMMUNITY
Start: 2018-05-18

## 2018-06-11 NOTE — PROGRESS NOTES
[address]  PROGRESS NOTE  Chief Complaint:   Patient presents with: Follow - Up  Diabetes      HPI:   This is a 47year old female coming in for follow-up on her diabetes.   She finds it very difficult now to check her blood sugars gena Value Ref Range   Microalbumin, Urine 2.48 mg/dL   Creatinine Ur Random 176.00 mg/dL   Malb/Cre Calc 14.1 <=30.0 ug/mg   -ASSAY, THYROID STIM HORMONE   Result Value Ref Range   TSH 2.550 0.350 - 5.500 mIU/mL   -URIC ACID, SERUM   Result Value Ref Range   U Meds:    Current Outpatient Prescriptions:  ondansetron 8 MG Oral Tablet Dispersible Take 8 mg by mouth every 8 (eight) hours as needed.  Disp:  Rfl:    Prochlorperazine Maleate (COMPAZINE) 10 mg tablet Take 10 mg by mouth every 6 (six) hours as needed for pain, nausea, vomiting, constipation, diarrhea, or blood in stool. MUSCULOSKELETAL:  Denies weakness, muscle aches, back pain, joint pain, swelling or stiffness.   NEUROLOGICAL:  Denies headache, seizures, dizziness, syncope, paralysis, ataxia, numbness or spasm, SLR test negative, FROM. EXTREMITIES:  No edema, no cyanosis, no clubbing, FROM, 2+ dorsalis pedis pulses bilaterally. NEURO: She is not moving her right arm well at all.   Bilateral barefoot skin diabetic exam is normal, visualized feet and the ap complications from the treatments as a result of today.      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

## 2018-06-11 NOTE — PATIENT INSTRUCTIONS
Stop taking Prandin - it is no longer needed. Call if any blood sugars below 80 - we will stop taking Trulicity if that happens.

## 2018-06-28 ENCOUNTER — OFFICE VISIT (OUTPATIENT)
Dept: FAMILY MEDICINE CLINIC | Facility: CLINIC | Age: 53
End: 2018-06-28

## 2018-06-28 VITALS
TEMPERATURE: 98 F | DIASTOLIC BLOOD PRESSURE: 60 MMHG | HEIGHT: 63 IN | RESPIRATION RATE: 14 BRPM | HEART RATE: 68 BPM | WEIGHT: 231.38 LBS | BODY MASS INDEX: 41 KG/M2 | SYSTOLIC BLOOD PRESSURE: 102 MMHG

## 2018-06-28 DIAGNOSIS — J02.0 STREP PHARYNGITIS: Primary | ICD-10-CM

## 2018-06-28 DIAGNOSIS — J02.9 SORE THROAT: ICD-10-CM

## 2018-06-28 PROCEDURE — 99214 OFFICE O/P EST MOD 30 MIN: CPT | Performed by: FAMILY MEDICINE

## 2018-06-28 PROCEDURE — 87880 STREP A ASSAY W/OPTIC: CPT | Performed by: FAMILY MEDICINE

## 2018-06-28 RX ORDER — DEXAMETHASONE 4 MG/1
TABLET ORAL
COMMUNITY
Start: 2018-06-18 | End: 2018-07-18 | Stop reason: ALTCHOICE

## 2018-06-28 RX ORDER — CEPHALEXIN 500 MG/1
500 CAPSULE ORAL 3 TIMES DAILY
Qty: 21 CAPSULE | Refills: 0 | Status: SHIPPED | OUTPATIENT
Start: 2018-06-28 | End: 2018-07-05

## 2018-06-28 RX ORDER — LEVETIRACETAM 500 MG/1
TABLET ORAL
COMMUNITY
Start: 2018-06-18 | End: 2018-07-18 | Stop reason: ALTCHOICE

## 2018-06-29 NOTE — PROGRESS NOTES
Chief Complaint:   Patient presents with:  Ear Problem: fluid  Throat Problem: scratchy, daughter started on antibiotics for strep  Lesion: feels sores inside of mouth, inside of lips      HPI:   This is a 47year old female coming in for headache, sore th Tab TAKE 1 TABLET DAILY Disp: 90 tablet Rfl: 3   MetFORMIN HCl 1000 MG Oral Tab Take 1 tablet (1,000 mg total) by mouth 2 (two) times daily with meals.  Disp: 180 tablet Rfl: 3   Multiple Vitamins-Minerals (WOMENS 50+ MULTI VITAMIN/MIN) Oral Tab Take 1 tabl 63\"   Wt 231 lb 6.4 oz   BMI 40.99 kg/m²  Estimated body mass index is 40.99 kg/m² as calculated from the following:    Height as of this encounter: 63\". Weight as of this encounter: 231 lb 6.4 oz. Vital signs reviewed. Appears stated age, well groom side effects or complications from the treatments as a result of today.      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

## 2018-07-18 ENCOUNTER — OFFICE VISIT (OUTPATIENT)
Dept: FAMILY MEDICINE CLINIC | Facility: CLINIC | Age: 53
End: 2018-07-18
Payer: COMMERCIAL

## 2018-07-18 VITALS
HEIGHT: 63 IN | WEIGHT: 238.19 LBS | TEMPERATURE: 99 F | SYSTOLIC BLOOD PRESSURE: 122 MMHG | RESPIRATION RATE: 20 BRPM | DIASTOLIC BLOOD PRESSURE: 84 MMHG | BODY MASS INDEX: 42.2 KG/M2 | HEART RATE: 84 BPM

## 2018-07-18 DIAGNOSIS — E11.9 TYPE 2 DIABETES MELLITUS WITHOUT COMPLICATION, WITHOUT LONG-TERM CURRENT USE OF INSULIN (HCC): ICD-10-CM

## 2018-07-18 DIAGNOSIS — I80.3 THROMBOPHLEBITIS OF LEG: Primary | ICD-10-CM

## 2018-07-18 DIAGNOSIS — I10 BENIGN ESSENTIAL HYPERTENSION: ICD-10-CM

## 2018-07-18 PROBLEM — G81.01 FLACCID HEMIPLEGIA OF RIGHT DOMINANT SIDE DUE TO NONCEREBROVASCULAR ETIOLOGY (HCC): Status: ACTIVE | Noted: 2018-06-12

## 2018-07-18 PROBLEM — G93.89 BRAIN MASS: Status: ACTIVE | Noted: 2018-06-12

## 2018-07-18 PROCEDURE — 99214 OFFICE O/P EST MOD 30 MIN: CPT | Performed by: FAMILY MEDICINE

## 2018-07-18 RX ORDER — ACETAMINOPHEN 500 MG
500 TABLET ORAL EVERY 8 HOURS PRN
COMMUNITY

## 2018-07-18 RX ORDER — DOCUSATE SODIUM 100 MG/1
100 CAPSULE, LIQUID FILLED ORAL 2 TIMES DAILY
COMMUNITY
End: 2018-11-30

## 2018-07-18 RX ORDER — ACETAMINOPHEN 160 MG
2000 TABLET,DISINTEGRATING ORAL DAILY
COMMUNITY

## 2018-07-19 NOTE — PROGRESS NOTES
[address]  PROGRESS NOTE  Chief Complaint:   Patient presents with: Other: Medication for DVT      HPI:   This is a 47year old female coming in for evaluation of a clot in her leg.   She just recently had gamma knife surgery on the les History:  Family History   Problem Relation Age of Onset   • Cancer Father    • Heart Disorder Mother      Allergies:    No Known Allergies        Current Meds:    Current Outpatient Prescriptions:  acetaminophen 500 MG Oral Tab Take 500 mg by mouth every mouth every 6 (six) hours as needed for Nausea. Disp:  Rfl:       Counseling given: Not Answered       REVIEW OF SYSTEMS:   CONSTITUTIONAL: She is very fatigued and sleeps much of the day.   EENT:  Eyes:  Denies eye pain, visual loss, blurred vision, doub atraumatic Eyes: EOMI, PERRLA, no scleral icterus, conjunctivae clear bilaterally, no eye discharge Ears: External normal. Nose: patent, no nasal discharge Throat:  No tonsillar erythema or exudate. Mouth:  No oral lesions or ulcerations, good dentition. Refills for this Visit:  Signed Prescriptions Disp Refills    Rivaroxaban (XARELTO) 20 MG Oral Tab 30 tablet 5      Sig: Take 1 tablet (20 mg total) by mouth daily with food.            Patient/Caregiver Education: Patient/Caregiver Education: There are no

## 2018-08-08 RX ORDER — LISINOPRIL 10 MG/1
TABLET ORAL
Qty: 90 TABLET | Refills: 3 | Status: SHIPPED | OUTPATIENT
Start: 2018-08-08 | End: 2019-07-22

## 2018-08-08 NOTE — TELEPHONE ENCOUNTER
Future appt:     Your appointments     Date & Time Appointment Department [facility])    Dec 07, 2018  8:45 AM CST Laboratory Visit with [facility] ([facility])    Dec 11, 2018  9:00 AM CST Exam - Established Patient with Alexa Morel

## 2018-10-01 ENCOUNTER — TELEPHONE (OUTPATIENT)
Dept: FAMILY MEDICINE CLINIC | Facility: CLINIC | Age: 53
End: 2018-10-01

## 2018-10-01 NOTE — TELEPHONE ENCOUNTER
Patient calling to see if She has ever had a Pneumonia Injection? Informed not with [facility]/agreed.   Keyla Rand, 10/01/18, 4:36 PM

## 2018-10-12 ENCOUNTER — TELEPHONE (OUTPATIENT)
Dept: FAMILY MEDICINE CLINIC | Facility: CLINIC | Age: 53
End: 2018-10-12

## 2018-10-12 NOTE — TELEPHONE ENCOUNTER
Patient has a lab appointment scheduled for 11/07/18, patient would like to see if she can get the lab order to have it done at [facility]. Patient requested a call back.

## 2018-11-16 ENCOUNTER — MED REC SCAN ONLY (OUTPATIENT)
Dept: FAMILY MEDICINE CLINIC | Facility: CLINIC | Age: 53
End: 2018-11-16

## 2018-11-30 ENCOUNTER — OFFICE VISIT (OUTPATIENT)
Dept: FAMILY MEDICINE CLINIC | Facility: CLINIC | Age: 53
End: 2018-11-30
Payer: COMMERCIAL

## 2018-11-30 VITALS
TEMPERATURE: 98 F | WEIGHT: 229 LBS | SYSTOLIC BLOOD PRESSURE: 120 MMHG | DIASTOLIC BLOOD PRESSURE: 86 MMHG | OXYGEN SATURATION: 99 % | BODY MASS INDEX: 41 KG/M2 | HEART RATE: 78 BPM | RESPIRATION RATE: 16 BRPM

## 2018-11-30 DIAGNOSIS — K42.9 UMBILICAL HERNIA WITHOUT OBSTRUCTION AND WITHOUT GANGRENE: ICD-10-CM

## 2018-11-30 DIAGNOSIS — J02.9 PHARYNGITIS, UNSPECIFIED ETIOLOGY: Primary | ICD-10-CM

## 2018-11-30 DIAGNOSIS — K14.0 GLOSSITIS: ICD-10-CM

## 2018-11-30 PROCEDURE — 99214 OFFICE O/P EST MOD 30 MIN: CPT | Performed by: FAMILY MEDICINE

## 2018-11-30 PROCEDURE — 87081 CULTURE SCREEN ONLY: CPT | Performed by: FAMILY MEDICINE

## 2018-11-30 PROCEDURE — 87880 STREP A ASSAY W/OPTIC: CPT | Performed by: FAMILY MEDICINE

## 2018-11-30 RX ORDER — ABEMACICLIB 100 MG/1
100 TABLET ORAL 2 TIMES DAILY
Refills: 6 | COMMUNITY
Start: 2018-11-19

## 2018-11-30 RX ORDER — CLOTRIMAZOLE 10 MG/1
LOZENGE ORAL; TOPICAL
Qty: 15 TROCHE | Refills: 0 | Status: SHIPPED | OUTPATIENT
Start: 2018-11-30

## 2018-11-30 RX ORDER — LOPERAMIDE HYDROCHLORIDE 2 MG/1
1 TABLET ORAL AS NEEDED
COMMUNITY
End: 2019-11-27

## 2018-11-30 NOTE — PROGRESS NOTES
[facility] [place]  PROGRESS NOTE  Chief Complaint:   Patient presents with:  Sore Throat: Sores in mouth  Hernia      HPI:   This is a 47year old female coming in for sore throat, sores in mouth, and possible umbilical hernia.   Patient has had Heart Disorder Mother      Allergies:    No Known Allergies        Current Meds:    Current Outpatient Medications:  Loperamide HCl 2 MG Oral Tab Take 1 tablet by mouth as needed.  Disp:  Rfl:    VERZENIO 150 MG Oral Tab Take 1 tablet by mouth 2 (two) times dryness.   CARDIOVASCULAR:  Denies chest pain, chest pressure, chest discomfort, palpitations, edema, dyspnea on exertion or at rest.  RESPIRATORY:  See HPI  GASTROINTESTINAL: See HPI  MUSCULOSKELETAL:  Denies weakness, muscle aches, back pain, joint pain, etiology    Glossitis    Umbilical hernia without obstruction and without gangrene        PLAN: #1 pharyngitis strep is negative. Tylenol. Recheck if worsens. 2.  Sore tongue: Has been going on for 2 weeks.   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

## 2018-12-11 ENCOUNTER — OFFICE VISIT (OUTPATIENT)
Dept: FAMILY MEDICINE CLINIC | Facility: CLINIC | Age: 53
End: 2018-12-11
Payer: COMMERCIAL

## 2018-12-11 VITALS
BODY MASS INDEX: 41.15 KG/M2 | SYSTOLIC BLOOD PRESSURE: 128 MMHG | DIASTOLIC BLOOD PRESSURE: 84 MMHG | RESPIRATION RATE: 18 BRPM | WEIGHT: 232.25 LBS | HEIGHT: 63 IN | HEART RATE: 120 BPM | TEMPERATURE: 98 F

## 2018-12-11 DIAGNOSIS — M15.9 PRIMARY OSTEOARTHRITIS INVOLVING MULTIPLE JOINTS: ICD-10-CM

## 2018-12-11 DIAGNOSIS — C79.31 SECONDARY MALIGNANT NEOPLASM OF BRAIN (HCC): ICD-10-CM

## 2018-12-11 DIAGNOSIS — Z86.718 HISTORY OF DEEP VENOUS THROMBOSIS (DVT) OF DISTAL VEIN OF LEFT LOWER EXTREMITY: ICD-10-CM

## 2018-12-11 DIAGNOSIS — I10 BENIGN ESSENTIAL HYPERTENSION: ICD-10-CM

## 2018-12-11 DIAGNOSIS — C50.919 METASTATIC BREAST CANCER (HCC): ICD-10-CM

## 2018-12-11 DIAGNOSIS — E11.9 TYPE 2 DIABETES MELLITUS WITHOUT COMPLICATION, WITHOUT LONG-TERM CURRENT USE OF INSULIN (HCC): Primary | ICD-10-CM

## 2018-12-11 PROBLEM — R06.02 SHORTNESS OF BREATH: Status: ACTIVE | Noted: 2018-08-17

## 2018-12-11 PROBLEM — E83.42 HYPOMAGNESEMIA: Status: ACTIVE | Noted: 2018-10-02

## 2018-12-11 PROCEDURE — 99214 OFFICE O/P EST MOD 30 MIN: CPT | Performed by: FAMILY MEDICINE

## 2018-12-11 NOTE — PROGRESS NOTES
[address]  PROGRESS NOTE  Chief Complaint:   Patient presents with: Follow - Up  Diabetes      HPI:   This is a 47year old female coming in for follow-up. She said that her diabetes control has been good.   She does have occasional lo Laterality Date   • HERNIA SURGERY     • KNEE ARTHROSCOPY  1983    right knee   • KNEE SURGERY Right 1983   • LUMPECTOMY LEFT     • MASTECTOMY LEFT  2007   • MASTECTOMY LEFT Left 2007    also 22 lymph nodes- pos-  had chemo, radiation - while preg   • UMBI Monitoring Suppl (ONETOUCH ULTRA 2) w/Device Does not apply Kit  Disp:  Rfl:    Glucose Blood (ONETOUCH ULTRA BLUE) In Vitro Strip 2 each by Finger stick route daily.    Disp:  Rfl:    Insulin Pen Needle (BD PEN NEEDLE SHORT U/F) 31G X 8 MM Does not apply M reviewed. Appears stated age, well groomed. Physical Exam:  GEN:  Patient is alert, awake and oriented, well developed, well nourished, no apparent distress.   HEENT:  Head:  Normocephalic, atraumatic Eyes: EOMI, PERRLA, no scleral icterus, conjunctivae cl of brain [facility])  She has a history of a secondary malignant neoplasm of the brain. 5. Primary osteoarthritis involving multiple joints  She does have osteoarthritis involving multiple joints.     6. Metastatic breast cancer ([facility]  She has stage IV widely breath      Jatinder Hare MD  12/11/2018  9:35 AM

## 2019-04-18 ENCOUNTER — TELEPHONE (OUTPATIENT)
Dept: FAMILY MEDICINE CLINIC | Facility: CLINIC | Age: 54
End: 2019-04-18

## 2019-04-18 DIAGNOSIS — E11.9 TYPE 2 DIABETES MELLITUS WITHOUT COMPLICATION, WITHOUT LONG-TERM CURRENT USE OF INSULIN (HCC): Primary | ICD-10-CM

## 2019-04-18 NOTE — TELEPHONE ENCOUNTER
pt is having labs done at [facility] the last week of may- is requesting that an order  for an A1c be sent to [facility] so Dr Jeannie Weems can have those results by her appt with him on 6/3

## 2019-06-05 ENCOUNTER — OFFICE VISIT (OUTPATIENT)
Dept: FAMILY MEDICINE CLINIC | Facility: CLINIC | Age: 54
End: 2019-06-05
Payer: COMMERCIAL

## 2019-06-05 ENCOUNTER — MED REC SCAN ONLY (OUTPATIENT)
Dept: FAMILY MEDICINE CLINIC | Facility: CLINIC | Age: 54
End: 2019-06-05

## 2019-06-05 VITALS
HEART RATE: 95 BPM | TEMPERATURE: 98 F | OXYGEN SATURATION: 92 % | HEIGHT: 63 IN | SYSTOLIC BLOOD PRESSURE: 122 MMHG | BODY MASS INDEX: 42.77 KG/M2 | DIASTOLIC BLOOD PRESSURE: 80 MMHG | RESPIRATION RATE: 16 BRPM | WEIGHT: 241.38 LBS

## 2019-06-05 DIAGNOSIS — I15.2 HYPERTENSION ASSOCIATED WITH DIABETES (HCC): ICD-10-CM

## 2019-06-05 DIAGNOSIS — K59.1 FUNCTIONAL DIARRHEA: ICD-10-CM

## 2019-06-05 DIAGNOSIS — C50.412 MALIGNANT NEOPLASM OF UPPER-OUTER QUADRANT OF LEFT FEMALE BREAST, UNSPECIFIED ESTROGEN RECEPTOR STATUS (HCC): ICD-10-CM

## 2019-06-05 DIAGNOSIS — E11.59 HYPERTENSION ASSOCIATED WITH DIABETES (HCC): ICD-10-CM

## 2019-06-05 DIAGNOSIS — I89.0 LYMPHEDEMA OF LEFT ARM: ICD-10-CM

## 2019-06-05 DIAGNOSIS — E11.9 TYPE 2 DIABETES MELLITUS WITHOUT COMPLICATION, WITHOUT LONG-TERM CURRENT USE OF INSULIN (HCC): Primary | ICD-10-CM

## 2019-06-05 DIAGNOSIS — M15.9 PRIMARY OSTEOARTHRITIS INVOLVING MULTIPLE JOINTS: ICD-10-CM

## 2019-06-05 DIAGNOSIS — C79.31 SECONDARY MALIGNANT NEOPLASM OF BRAIN (HCC): ICD-10-CM

## 2019-06-05 DIAGNOSIS — C78.01 MALIGNANT NEOPLASM METASTATIC TO RIGHT LUNG (HCC): ICD-10-CM

## 2019-06-05 DIAGNOSIS — I10 BENIGN ESSENTIAL HYPERTENSION: ICD-10-CM

## 2019-06-05 DIAGNOSIS — E66.01 CLASS 3 SEVERE OBESITY DUE TO EXCESS CALORIES WITH SERIOUS COMORBIDITY AND BODY MASS INDEX (BMI) OF 40.0 TO 44.9 IN ADULT (HCC): ICD-10-CM

## 2019-06-05 DIAGNOSIS — I89.0 LYMPHEDEMA: ICD-10-CM

## 2019-06-05 PROCEDURE — 99214 OFFICE O/P EST MOD 30 MIN: CPT | Performed by: FAMILY MEDICINE

## 2019-06-05 RX ORDER — LORAZEPAM 1 MG/1
1 TABLET ORAL EVERY 4 HOURS PRN
COMMUNITY
End: 2021-12-28

## 2019-06-05 RX ORDER — CLOTRIMAZOLE AND BETAMETHASONE DIPROPIONATE 10; .64 MG/G; MG/G
CREAM TOPICAL 2 TIMES DAILY PRN
Refills: 2 | COMMUNITY
Start: 2018-12-30 | End: 2019-12-09

## 2019-06-05 NOTE — PROGRESS NOTES
[address]  PROGRESS NOTE  Chief Complaint:   Patient presents with: Follow - Up: diabetes  Eye Problem: Left eye, keeps having itching and puss problem      HPI:   This is a 47year old female coming in for follow-up on her diabetes. visit on 11/30/18   STREP A ASSAY W/OPTIC   Result Value Ref Range    Strep Grp A Screen Negative Negative    Control Line Present with a clear background (yes/no) Yes Yes/No    Kit Lot # [id number] Numeric    Kit Expiration Date 5/31/2020 Date   GRP A STR Medications:  clotrimazole-betamethasone 1-0.05 % External Cream 2 (two) times daily as needed. Disp:  Rfl: 2   aspirin 325 MG Oral Tab EC Take 325 mg by mouth daily. Disp:  Rfl:    VERZENIO 100 MG Oral Tab Take 100 mg by mouth 2 (two) times daily.    Disp: eyelids on both sides. She did have some pus from the left eye but that has improved. Ears, Nose, Throat:  Denies hearing loss, sneezing, congestion, runny nose or sore throat.   INTEGUMENTARY:  Denies rashes, itching, skin lesion, or excessive skin dryne thyromegaly. SKIN: No rashes, no skin lesion, no bruising, good turgor. HEART:  Regular rate and rhythm, no murmurs, rubs or gallops. LUNGS: Clear to auscultation bilterally, no rales/rhonchi/wheezing.   ABDOMEN:  Soft, nondistended, nontender, bowel neema multiple joints. Her symptoms are pretty mild now. 9. Lymphedema of left arm  She is receiving therapy for the lymphedema of her left arm.     10. Class 3 severe obesity due to excess calories with serious comorbidity and body mass index (BMI) of 40.0 t retention cyst     Psoriasis     Elevated tumor markers     Hypertension associated with diabetes ([facility] [address] [address]     Claustrophobia     Hip pain, chronic, left     Malignant neoplasm metastatic to right lung [facility])     Secondary malignant neoplasm of bone ([facility] [address] [address]

## 2019-06-14 ENCOUNTER — TELEPHONE (OUTPATIENT)
Dept: FAMILY MEDICINE CLINIC | Facility: CLINIC | Age: 54
End: 2019-06-14

## 2019-06-14 ENCOUNTER — MED REC SCAN ONLY (OUTPATIENT)
Dept: FAMILY MEDICINE CLINIC | Facility: CLINIC | Age: 54
End: 2019-06-14

## 2019-06-14 NOTE — TELEPHONE ENCOUNTER
Patient states labs were reviewed with her at time of last visit with . She is aware of results.   Shara Tafoya, 06/14/19, 1:34 PM

## 2019-06-14 NOTE — TELEPHONE ENCOUNTER
----- Message from El Best MD sent at 6/14/2019 12:58 PM CDT -----  Please call Lissett Esteves. Her labs look good. Her microalbumin to creatinine ratio is slightly elevated at 99. This means that there is a risk of damage to her kidneys in the future.

## 2019-06-28 LAB
ALBUMIN: 3.9 G/DL
ALKALINE PHOSPHATASE: 76
ALT (SGPT): 17 IU/L
ANION GAP: 12
AST (SGOT): 21 IU/L
BASOPHIL COUNT: 0
BASOPHILS: 1
BILIRUBIN, TOTAL: 0.3 MG/DL
CA 27.29: 66 U/ML
CALCIUM: 10.3
CHLORIDE: 105
CO2: 25
CREATININE: 0.7 MG/DL
EOSINOPHIL COUNT: 0.1
EOSINOPHILS: 3
GFR AFRICAN AMERICAN: 113
GFR NON-AFRICAN AMERICAN: 93
GLUCOSE BLOOD: 122
HCT: 36.9
HGB: 12.7
LYMPHOCYTE COUNT: 1.3
LYMPHOCYTES: 32
MAGNESIUM: 1.8
MEAN CELL VOLUME: 105
MEAN CORPUSCULAR HEMOGLOBIN: 36
MEAN CORPUSCULAR HGB CONC: 34
MEAN PLATELET VOLUME: 9.1
MONOCYTE COUNT: 0.2
MONOCYTES: 5 %
NEUTROPHIL COUNT: 2.4
NEUTROPHILS: 60 %
PLT: 154
POTASSIUM: 4.7
RED BLOOD COUNT: 3.5
RED CELL DISTRIBUTION WIDTH: 13
SODIUM: 142
TOTAL PROTEIN: 6.9
UREA NITROGEN, 24HR: 15 MG/24 HR
WBC: 4

## 2019-07-22 DIAGNOSIS — I10 BENIGN ESSENTIAL HYPERTENSION: ICD-10-CM

## 2019-07-22 DIAGNOSIS — E11.9 TYPE 2 DIABETES MELLITUS WITHOUT COMPLICATION, WITHOUT LONG-TERM CURRENT USE OF INSULIN (HCC): Primary | ICD-10-CM

## 2019-07-22 RX ORDER — LISINOPRIL 10 MG/1
TABLET ORAL
Qty: 90 TABLET | Refills: 1 | Status: SHIPPED | OUTPATIENT
Start: 2019-07-22 | End: 2020-02-14

## 2019-07-22 NOTE — TELEPHONE ENCOUNTER
Future appt: Your appointments     Date & Time Appointment Department [facility])    Dec 04, 2019  9:30 AM CST Physical - Established Patient with Pauline Palafox MD [address], [place] ([facility])            25 [facility], [address] [place] [address] Group Ashlee Wright [address] [id number] [phone]        Last Appointment:  6/5/2019  Cholesterol, Total (mg/dL)   Date Value   12/04/2017 132     HDL Cholesterol (mg/dL)   Date Value   12/04/2017 50     LDL Cholesterol (mg/dL)   Date Value   12/04/2017 61     Triglycerides (mg/dL)   Date Value   12/04/2017 104     Lab Results   Component Value Date     (H) 12/04/2017    A1C 6.2 06/03/2019     Lab Results   Component Value Date    TSH 2.550 12/04/2017     Last RF:  8/8/2018    No follow-ups on file.

## 2019-11-22 ENCOUNTER — TELEPHONE (OUTPATIENT)
Dept: FAMILY MEDICINE CLINIC | Facility: CLINIC | Age: 54
End: 2019-11-22

## 2019-11-22 NOTE — TELEPHONE ENCOUNTER
Patient requested a call back from nurse regarding her appt she has coming up at [facility].

## 2019-11-27 ENCOUNTER — TELEPHONE (OUTPATIENT)
Dept: FAMILY MEDICINE CLINIC | Facility: CLINIC | Age: 54
End: 2019-11-27

## 2019-11-27 ENCOUNTER — OFFICE VISIT (OUTPATIENT)
Dept: FAMILY MEDICINE CLINIC | Facility: CLINIC | Age: 54
End: 2019-11-27
Payer: COMMERCIAL

## 2019-11-27 VITALS
WEIGHT: 258 LBS | BODY MASS INDEX: 45.71 KG/M2 | HEIGHT: 63 IN | RESPIRATION RATE: 18 BRPM | OXYGEN SATURATION: 95 % | SYSTOLIC BLOOD PRESSURE: 124 MMHG | HEART RATE: 103 BPM | DIASTOLIC BLOOD PRESSURE: 60 MMHG | TEMPERATURE: 99 F

## 2019-11-27 DIAGNOSIS — J01.01 ACUTE RECURRENT MAXILLARY SINUSITIS: Primary | ICD-10-CM

## 2019-11-27 PROBLEM — J01.40 SUBACUTE PANSINUSITIS: Status: ACTIVE | Noted: 2019-11-27

## 2019-11-27 PROBLEM — Z80.9 FAMILY HISTORY OF CANCER: Status: ACTIVE | Noted: 2019-07-05

## 2019-11-27 PROBLEM — Z13.79 GENETIC TESTING: Status: ACTIVE | Noted: 2019-07-22

## 2019-11-27 PROCEDURE — 99213 OFFICE O/P EST LOW 20 MIN: CPT | Performed by: FAMILY MEDICINE

## 2019-11-27 RX ORDER — THIAMINE HCL 100 MG
2 TABLET ORAL DAILY
COMMUNITY
End: 2020-12-09

## 2019-11-27 RX ORDER — LIDOCAINE HYDROCHLORIDE 20 MG/ML
SOLUTION OROPHARYNGEAL
Refills: 5 | COMMUNITY
Start: 2019-08-07

## 2019-11-27 RX ORDER — FLUTICASONE PROPIONATE 50 MCG
1 SPRAY, SUSPENSION (ML) NASAL 2 TIMES DAILY
Qty: 16 G | Refills: 3 | Status: SHIPPED | OUTPATIENT
Start: 2019-11-27 | End: 2020-12-09

## 2019-11-27 RX ORDER — AMOXICILLIN AND CLAVULANATE POTASSIUM 875; 125 MG/1; MG/1
1 TABLET, FILM COATED ORAL 2 TIMES DAILY
Qty: 20 TABLET | Refills: 0 | Status: SHIPPED | OUTPATIENT
Start: 2019-11-27 | End: 2019-12-07

## 2019-11-27 NOTE — PROGRESS NOTES
[facility] [place]  PROGRESS NOTE  Chief Complaint:   Patient presents with:  Sinus Problem  Chest Congestion  Cough      HPI:   This is a 49year old female coming in for sinus congestion and pressure with a cough and occasional wheezing.   She Result Value Ref Range    TSH 2.22 uIU/mL       Past Medical History:   Diagnosis Date   • Cancer [facility]) 2007    breast cancer- L side   • Diabetes ([facility] [address] 2007   • Essential hypertension    • History of deep venous thrombosis (DVT) of distal vein of left l tablet 0   • Fluticasone Propionate 50 MCG/ACT Nasal Suspension 1 spray by Each Nare route 2 (two) times daily.  16 g 3   • METFORMIN HCL 1000 MG Oral Tab TAKE 1 TABLET TWICE A DAY WITH MEALS 180 tablet 1   • LISINOPRIL 10 MG Oral Tab TAKE 1 TABLET DAILY 90 vision, double vision or yellow sclerae. Ears, Nose, Throat: She has fluid in her left ear. She has a lot of fullness and pressure across the front part of her face. INTEGUMENTARY:  Denies rashes, itching, skin lesion, or excessive skin dryness.   Melanie Hamilton CLAD, no JVD, no thyromegaly. SKIN: No rashes, no skin lesion, no bruising, good turgor. HEART:  Regular rate and rhythm, no murmurs, rubs or gallops. LUNGS: Good air entry in the upper lung fields bilaterally. Diminished air entry in the right base. of today.      Problem List:  Patient Active Problem List:     Umbilical hernia     Obesity     Malignant neoplasm of upper-outer quadrant of female breast ([address]     Benign essential hypertension     Eczema     Type 2 diabetes mellitus without complication

## 2019-12-09 ENCOUNTER — OFFICE VISIT (OUTPATIENT)
Dept: FAMILY MEDICINE CLINIC | Facility: CLINIC | Age: 54
End: 2019-12-09
Payer: COMMERCIAL

## 2019-12-09 ENCOUNTER — TELEPHONE (OUTPATIENT)
Dept: FAMILY MEDICINE CLINIC | Facility: CLINIC | Age: 54
End: 2019-12-09

## 2019-12-09 VITALS
RESPIRATION RATE: 20 BRPM | HEIGHT: 63 IN | WEIGHT: 251.19 LBS | DIASTOLIC BLOOD PRESSURE: 84 MMHG | TEMPERATURE: 98 F | SYSTOLIC BLOOD PRESSURE: 130 MMHG | HEART RATE: 96 BPM | BODY MASS INDEX: 44.51 KG/M2

## 2019-12-09 DIAGNOSIS — C79.51 SECONDARY MALIGNANT NEOPLASM OF BONE (HCC): ICD-10-CM

## 2019-12-09 DIAGNOSIS — E11.9 TYPE 2 DIABETES MELLITUS WITHOUT COMPLICATION, WITHOUT LONG-TERM CURRENT USE OF INSULIN (HCC): ICD-10-CM

## 2019-12-09 DIAGNOSIS — J91.0 PLEURAL EFFUSION, MALIGNANT: ICD-10-CM

## 2019-12-09 DIAGNOSIS — Z00.00 HEALTHCARE MAINTENANCE: Primary | ICD-10-CM

## 2019-12-09 DIAGNOSIS — C50.412 MALIGNANT NEOPLASM OF UPPER-OUTER QUADRANT OF LEFT FEMALE BREAST, UNSPECIFIED ESTROGEN RECEPTOR STATUS (HCC): ICD-10-CM

## 2019-12-09 DIAGNOSIS — I10 BENIGN ESSENTIAL HYPERTENSION: ICD-10-CM

## 2019-12-09 PROCEDURE — 88175 CYTOPATH C/V AUTO FLUID REDO: CPT | Performed by: FAMILY MEDICINE

## 2019-12-09 PROCEDURE — 99213 OFFICE O/P EST LOW 20 MIN: CPT | Performed by: FAMILY MEDICINE

## 2019-12-09 PROCEDURE — 99396 PREV VISIT EST AGE 40-64: CPT | Performed by: FAMILY MEDICINE

## 2019-12-09 RX ORDER — CLOTRIMAZOLE AND BETAMETHASONE DIPROPIONATE 10; .64 MG/G; MG/G
1 CREAM TOPICAL 2 TIMES DAILY PRN
Qty: 30 G | Refills: 3 | Status: SHIPPED | OUTPATIENT
Start: 2019-12-09 | End: 2021-07-03

## 2019-12-09 RX ORDER — FUROSEMIDE 20 MG/1
1 TABLET ORAL DAILY
Refills: 0 | COMMUNITY
Start: 2019-11-27 | End: 2020-12-09

## 2019-12-09 RX ORDER — LAMOTRIGINE 25 MG/1
1 TABLET ORAL
COMMUNITY

## 2019-12-09 NOTE — TELEPHONE ENCOUNTER
Patient saw Dr. Shahrair Gandara this morning for office visit. She was not feeling well this morning when she was here and did not think to ask a few questions she has now. 1) Doctor discussed two medications she could try for her diabetes.  One was trulicity

## 2019-12-09 NOTE — TELEPHONE ENCOUNTER
The other medication that I had considered giving her rather than Trulicity was Januvia. The advantage of Januvia is that it is slightly less expensive. The drawback is that it is not anywhere near as effective as Trulicity in controlling blood sugars.

## 2019-12-10 NOTE — PROGRESS NOTES
[facility] [place]  PROGRESS NOTE  Chief Complaint:   Patient presents with:  Physical  Diabetes  Vaginal Problem      HPI:   This is a 49year old female coming in for her annual wellness visit. She has metastatic stage IV breast cancer.   Vimal MCH 35 pg    MCHC 33 g/dL    PLATELET COUNT 620     MPV 10.0     RDW-CV 13     Glucose 321 mg/dL    BUN 13     CREATININE, SERUM 0.7     SODIUM, SERUM 136     POTASSIUM, SERUM 5.0     CHLORIDE, SERUM 101     CO2 25     ANION GAP 10     Calcium 9.6 mg/dL while preg   • UMBILICAL HERNIA REPAIR  2014     Social History:  Social History    Tobacco Use      Smoking status: Never Smoker      Smokeless tobacco: Never Used    Alcohol use: No      Alcohol/week: 0.0 standard drinks    Drug use: No    Family History daily.     • Vitamin D3 2000 units Oral Cap Take 2,000 Units by mouth daily. • ondansetron 8 MG Oral Tablet Dispersible Take 8 mg by mouth every 8 (eight) hours as needed.      • Prochlorperazine Maleate (COMPAZINE) 10 mg tablet Take 10 mg by mouth ever excessive sweating, cold or heat intolerance, polyuria or polydipsia. ALLERGIES:  Denies allergic response, history of asthma, sneezing, hives, eczema or rhinitis.      EXAM:   /84 (BP Location: Right arm, Patient Position: Sitting, Cuff Size: large) edema, no cyanosis, no clubbing, FROM, 2+ dorsalis pedis pulses bilaterally. NEURO:  No deficit, normal gait, strength and tone, sensory intact, normal reflexes.   Bilateral barefoot skin diabetic exam is normal, visualized feet and the appearance is pascual (TRULICITY) 1.5 UN/6.9GZ Subcutaneous Solution Pen-injector 13 pen 3     Sig: Inject 1.5 mg into the skin once a week.        Health Maintenance:  Annual Physical due on 11/10/1969  Pap Smear,3 Years due on 11/10/1997  FIT Colorectal Screening due on 11/10/ Lymphedema of left arm     Lymphedema     Functional diarrhea     Subacute pansinusitis     Genetic testing     Family history of cancer     Acute recurrent maxillary sinusitis     Healthcare maintenance      Bro Dominguez MD  12/9/2019  6:40 PM

## 2019-12-13 ENCOUNTER — TELEPHONE (OUTPATIENT)
Dept: FAMILY MEDICINE CLINIC | Facility: CLINIC | Age: 54
End: 2019-12-13

## 2019-12-13 NOTE — TELEPHONE ENCOUNTER
Re:  transfer Trulicity Rx to [facility]  for next month. Please dont send that one thur mail order.

## 2019-12-13 NOTE — TELEPHONE ENCOUNTER
Please advise. Future appt:     Your appointments     Date & Time Appointment Department [facility])    Jun 09, 2020  9:00 AM CDT Follow up - Extended with Tori Baca MD [address] [facility], [place] ([facility])

## 2019-12-13 NOTE — TELEPHONE ENCOUNTER
----- Message from Pennie Azevedo MD sent at 12/13/2019  3:26 PM CST -----  Please call Nitish Vallejo. Her Pap smear was negative for any signs of cancer. She will be due again in 3 years.

## 2020-02-14 DIAGNOSIS — E11.9 TYPE 2 DIABETES MELLITUS WITHOUT COMPLICATION, WITHOUT LONG-TERM CURRENT USE OF INSULIN (HCC): ICD-10-CM

## 2020-02-14 DIAGNOSIS — I10 BENIGN ESSENTIAL HYPERTENSION: ICD-10-CM

## 2020-02-14 RX ORDER — LISINOPRIL 10 MG/1
TABLET ORAL
Qty: 90 TABLET | Refills: 1 | Status: SHIPPED | OUTPATIENT
Start: 2020-02-14 | End: 2020-07-21

## 2020-05-18 RX ORDER — DULAGLUTIDE 1.5 MG/.5ML
INJECTION, SOLUTION SUBCUTANEOUS
Qty: 6 ML | Refills: 1 | Status: SHIPPED | OUTPATIENT
Start: 2020-05-18 | End: 2020-07-21

## 2020-05-18 NOTE — TELEPHONE ENCOUNTER
Future appt:     Your appointments     Date & Time Appointment Department [facility])    Jun 09, 2020  9:00 AM CDT Follow up - Extended with Daniela De La Cruz MD 25 [facility] ([place] [address] [place]

## 2020-06-05 ENCOUNTER — TELEPHONE (OUTPATIENT)
Dept: FAMILY MEDICINE CLINIC | Facility: CLINIC | Age: 55
End: 2020-06-05

## 2020-06-05 DIAGNOSIS — I15.2 HYPERTENSION ASSOCIATED WITH DIABETES (HCC): ICD-10-CM

## 2020-06-05 DIAGNOSIS — I10 BENIGN ESSENTIAL HYPERTENSION: Primary | ICD-10-CM

## 2020-06-05 DIAGNOSIS — L40.9 PSORIASIS: ICD-10-CM

## 2020-06-05 DIAGNOSIS — E11.9 TYPE 2 DIABETES MELLITUS WITHOUT COMPLICATION, WITHOUT LONG-TERM CURRENT USE OF INSULIN (HCC): ICD-10-CM

## 2020-06-05 DIAGNOSIS — E11.59 HYPERTENSION ASSOCIATED WITH DIABETES (HCC): ICD-10-CM

## 2020-06-05 NOTE — TELEPHONE ENCOUNTER
Patient wants to know if her lab orders were faxed over to [facility]?  Her appt is for 9 am    Would like a call back asap

## 2020-06-05 NOTE — TELEPHONE ENCOUNTER
Appt for Tuesday changed to Video Visit. Patient is Immune Compromised.   Rachel Pardo, 06/05/20, 12:54 PM

## 2020-06-05 NOTE — TELEPHONE ENCOUNTER
Your Appointments    Tuesday June 09, 2020  9:00 AM CDT  Follow up - Extended with Yudi Fitch MD  [address] [place] [address], [facility] [address] [place] [address]           Mariana

## 2020-06-08 ENCOUNTER — TELEPHONE (OUTPATIENT)
Dept: FAMILY MEDICINE CLINIC | Facility: CLINIC | Age: 55
End: 2020-06-08

## 2020-06-08 NOTE — TELEPHONE ENCOUNTER
Santo Vyas  verbally consents to a Virtual/Telephone Check-In service on 6/8/2020. Patient understands and accepts financial responsibility for any deductible, co-insurance and/or co-pays associated with this service.

## 2020-06-09 ENCOUNTER — TELEMEDICINE (OUTPATIENT)
Dept: FAMILY MEDICINE CLINIC | Facility: CLINIC | Age: 55
End: 2020-06-09
Payer: COMMERCIAL

## 2020-06-09 VITALS — BODY MASS INDEX: 43 KG/M2 | WEIGHT: 243 LBS

## 2020-06-09 DIAGNOSIS — C50.412 MALIGNANT NEOPLASM OF UPPER-OUTER QUADRANT OF LEFT FEMALE BREAST, UNSPECIFIED ESTROGEN RECEPTOR STATUS (HCC): ICD-10-CM

## 2020-06-09 DIAGNOSIS — E11.9 TYPE 2 DIABETES MELLITUS WITHOUT COMPLICATION, WITHOUT LONG-TERM CURRENT USE OF INSULIN (HCC): Primary | ICD-10-CM

## 2020-06-09 DIAGNOSIS — G81.01 FLACCID HEMIPLEGIA OF RIGHT DOMINANT SIDE DUE TO NONCEREBROVASCULAR ETIOLOGY (HCC): ICD-10-CM

## 2020-06-09 DIAGNOSIS — Z86.718 HISTORY OF DEEP VENOUS THROMBOSIS (DVT) OF DISTAL VEIN OF LEFT LOWER EXTREMITY: ICD-10-CM

## 2020-06-09 DIAGNOSIS — E11.59 HYPERTENSION ASSOCIATED WITH DIABETES (HCC): ICD-10-CM

## 2020-06-09 DIAGNOSIS — J91.0 PLEURAL EFFUSION, MALIGNANT: ICD-10-CM

## 2020-06-09 DIAGNOSIS — M15.9 PRIMARY OSTEOARTHRITIS INVOLVING MULTIPLE JOINTS: ICD-10-CM

## 2020-06-09 DIAGNOSIS — C50.919 METASTATIC BREAST CANCER (HCC): ICD-10-CM

## 2020-06-09 DIAGNOSIS — C78.01 MALIGNANT NEOPLASM METASTATIC TO RIGHT LUNG (HCC): ICD-10-CM

## 2020-06-09 DIAGNOSIS — I10 BENIGN ESSENTIAL HYPERTENSION: ICD-10-CM

## 2020-06-09 DIAGNOSIS — I15.2 HYPERTENSION ASSOCIATED WITH DIABETES (HCC): ICD-10-CM

## 2020-06-09 DIAGNOSIS — C79.51 SECONDARY MALIGNANT NEOPLASM OF BONE (HCC): ICD-10-CM

## 2020-06-09 DIAGNOSIS — C79.31 SECONDARY MALIGNANT NEOPLASM OF BRAIN (HCC): ICD-10-CM

## 2020-06-09 DIAGNOSIS — I89.0 LYMPHEDEMA OF LEFT ARM: ICD-10-CM

## 2020-06-09 DIAGNOSIS — C79.2 METASTASIS TO SKIN (HCC): ICD-10-CM

## 2020-06-09 PROCEDURE — 99213 OFFICE O/P EST LOW 20 MIN: CPT | Performed by: FAMILY MEDICINE

## 2020-06-09 NOTE — PATIENT INSTRUCTIONS
Continue the same medications. Please try to do more walking and outside exercise. Recheck labs in December.

## 2020-06-09 NOTE — PROGRESS NOTES
[address]  PROGRESS NOTE  Chief Complaint:   Patient presents with:  Diabetes  Follow - Up      HPI:   Lewis Perry is a 49year old female here today for a telemedicine/video visit.   The visit was completed using both audio and Information       Z00.00 Healthcare Maintenance. Reason for testing Screening     Gyn Additional Information       NOTE:  The Pap smear is a screening test that aids in the detection of cervical cancer and its precursors.   False negative and false p 6/12/2018     Past Surgical History:   Procedure Laterality Date   • HERNIA SURGERY     • KNEE ARTHROSCOPY  1983    right knee   • KNEE SURGERY Right 1983   • LUMPECTOMY LEFT     • MASTECTOMY LEFT  2007   • MASTECTOMY LEFT Left 2007    also 22 lymph nodes- ondansetron 8 MG Oral Tablet Dispersible Take 8 mg by mouth every 8 (eight) hours as needed. • Prochlorperazine Maleate (COMPAZINE) 10 mg tablet Take 10 mg by mouth every 6 (six) hours as needed for Nausea.        • Multiple Vitamins-Minerals (WOMENS 50 extremities,change in bowel or bladder control. HEMATOLOGIC:  Denies anemia, bleeding or bruising. LYMPHATICS:  Denies enlarged nodes or history of splenectomy. PSYCHIATRIC:  Denies depression or anxiety.   ENDOCRINOLOGIC:  Denies excessive sweating, col [facility])  She does have metastatic breast cancer to the right lung. She has a history of a right pleural effusion. She is doing well now. 7. Pleural effusion, malignant  She has a history of pleural effusion on the right side.     8. Primary osteoarthriti or changing symptoms. Patient is to call with any side effects or complications from the treatments as a result of today.      Problem List:  Patient Active Problem List:     Umbilical hernia     Obesity     Malignant neoplasm of upper-outer quadrant of fe

## 2020-07-21 DIAGNOSIS — E11.9 TYPE 2 DIABETES MELLITUS WITHOUT COMPLICATION, WITHOUT LONG-TERM CURRENT USE OF INSULIN (HCC): ICD-10-CM

## 2020-07-21 DIAGNOSIS — I10 BENIGN ESSENTIAL HYPERTENSION: ICD-10-CM

## 2020-07-21 RX ORDER — LISINOPRIL 10 MG/1
TABLET ORAL
Qty: 90 TABLET | Refills: 3 | Status: SHIPPED | OUTPATIENT
Start: 2020-07-21 | End: 2021-06-23

## 2020-07-21 NOTE — TELEPHONE ENCOUNTER
Future appt:     Your appointments     Date & Time Appointment Department [facility])    Dec 09, 2020  9:30 AM CST Physical - Established with Varun Asencio MD 25 [facility]            [place]

## 2020-07-21 NOTE — TELEPHONE ENCOUNTER
*Pt scheduled her physical for December. Pt was wondering if she could have labs done ahead of time. If so, she would like labs done at [facility]. Please advise. *    Future appt:    Last Appointment with provider: 12/9/2019  Last appointment at [facility]:

## 2020-11-10 ENCOUNTER — TELEPHONE (OUTPATIENT)
Dept: FAMILY MEDICINE CLINIC | Facility: CLINIC | Age: 55
End: 2020-11-10

## 2020-11-10 NOTE — TELEPHONE ENCOUNTER
Phoned patient-  Asked she her Eye Exam Report faxed to  /agreed. Patient has the fax number.   Shira Agustin, 11/10/20, 1:02 PM

## 2020-11-10 NOTE — TELEPHONE ENCOUNTER
FYI :  Got diabetic eye exam 8/17/20  - Call back if needed. I also faxed her lab orders to [facility].

## 2020-12-09 ENCOUNTER — OFFICE VISIT (OUTPATIENT)
Dept: FAMILY MEDICINE CLINIC | Facility: CLINIC | Age: 55
End: 2020-12-09
Payer: COMMERCIAL

## 2020-12-09 VITALS
SYSTOLIC BLOOD PRESSURE: 132 MMHG | BODY MASS INDEX: 40.75 KG/M2 | HEART RATE: 92 BPM | DIASTOLIC BLOOD PRESSURE: 82 MMHG | OXYGEN SATURATION: 99 % | WEIGHT: 230 LBS | HEIGHT: 63 IN | TEMPERATURE: 97 F | RESPIRATION RATE: 18 BRPM

## 2020-12-09 DIAGNOSIS — I89.0 LYMPHEDEMA OF LEFT ARM: ICD-10-CM

## 2020-12-09 DIAGNOSIS — I10 BENIGN ESSENTIAL HYPERTENSION: ICD-10-CM

## 2020-12-09 DIAGNOSIS — K59.1 FUNCTIONAL DIARRHEA: ICD-10-CM

## 2020-12-09 DIAGNOSIS — R06.02 SHORTNESS OF BREATH: ICD-10-CM

## 2020-12-09 DIAGNOSIS — E11.9 TYPE 2 DIABETES MELLITUS WITHOUT COMPLICATION, WITHOUT LONG-TERM CURRENT USE OF INSULIN (HCC): ICD-10-CM

## 2020-12-09 DIAGNOSIS — J91.0 PLEURAL EFFUSION, MALIGNANT: ICD-10-CM

## 2020-12-09 DIAGNOSIS — Z86.718 HISTORY OF DEEP VENOUS THROMBOSIS (DVT) OF DISTAL VEIN OF LEFT LOWER EXTREMITY: ICD-10-CM

## 2020-12-09 DIAGNOSIS — C79.51 SECONDARY MALIGNANT NEOPLASM OF BONE (HCC): ICD-10-CM

## 2020-12-09 DIAGNOSIS — D63.0 ANEMIA IN NEOPLASTIC DISEASE: ICD-10-CM

## 2020-12-09 DIAGNOSIS — Z00.00 HEALTHCARE MAINTENANCE: Primary | ICD-10-CM

## 2020-12-09 PROBLEM — K12.31 MUCOSITIS DUE TO CHEMOTHERAPY: Status: ACTIVE | Noted: 2020-07-15

## 2020-12-09 PROBLEM — I80.3 THROMBOPHLEBITIS OF LEG: Status: RESOLVED | Noted: 2018-07-18 | Resolved: 2020-12-09

## 2020-12-09 PROBLEM — N76.1 SUBACUTE VAGINITIS: Status: ACTIVE | Noted: 2020-07-15

## 2020-12-09 PROBLEM — J01.40 SUBACUTE PANSINUSITIS: Status: RESOLVED | Noted: 2019-11-27 | Resolved: 2020-12-09

## 2020-12-09 PROBLEM — J01.01 ACUTE RECURRENT MAXILLARY SINUSITIS: Status: RESOLVED | Noted: 2019-11-27 | Resolved: 2020-12-09

## 2020-12-09 PROCEDURE — 99213 OFFICE O/P EST LOW 20 MIN: CPT | Performed by: FAMILY MEDICINE

## 2020-12-09 PROCEDURE — 3075F SYST BP GE 130 - 139MM HG: CPT | Performed by: FAMILY MEDICINE

## 2020-12-09 PROCEDURE — 3008F BODY MASS INDEX DOCD: CPT | Performed by: FAMILY MEDICINE

## 2020-12-09 PROCEDURE — 99396 PREV VISIT EST AGE 40-64: CPT | Performed by: FAMILY MEDICINE

## 2020-12-09 PROCEDURE — 3079F DIAST BP 80-89 MM HG: CPT | Performed by: FAMILY MEDICINE

## 2020-12-09 NOTE — PROGRESS NOTES
[place] [facility] [place]  PROGRESS NOTE  Chief Complaint:   Patient presents with:  Physical: patient is fasting   Diabetes      HPI:   This is a 48year old female coming in for her annual wellness visit.     She said that with the current Covid janice sampling is recommended to minimize false negative results.       Case Report       Gynecologic Cytology                              Case: [id number]                                  Authorizing Provider:  Uriel Zendejas MD      Collected:           12 • UMBILICAL HERNIA REPAIR  2014     Social History:  Social History    Tobacco Use      Smoking status: Never Smoker      Smokeless tobacco: Never Used    Alcohol use: No      Alcohol/week: 0.0 standard drinks    Drug use: No    Family History:  Family H mouth every 6 (six) hours as needed for Nausea. • Multiple Vitamins-Minerals (WOMENS 50+ MULTI VITAMIN/MIN) Oral Tab Take 1 tablet by mouth daily.      • Blood Glucose Monitoring Suppl (ONETOUCH ULTRA 2) w/Device Does not apply Kit      • Glucose Bloo Height as of this encounter: 5' 3\" (1.6 m). Weight as of this encounter: 230 lb (104.3 kg). Vital signs reviewed. Appears stated age, well groomed.   Physical Exam:  GEN:  Patient is alert, awake and oriented, well developed, well nourished, no appar Pleural effusion, malignant  She has a history of malignant pleural effusion. She is not having any new shortness of breath. 7. Shortness of breath  She is not having any new shortness of breath now.     8. Secondary malignant neoplasm of bone ([facility] [address]  Sh tumor markers     Claustrophobia     Hip pain, chronic, left     Malignant neoplasm metastatic to right lung [facility])     Secondary malignant neoplasm of bone ([facility])     Metastasis to skin ([facility])     Pleural effusion, malignant     Pleural effusion, right     Y

## 2021-01-04 RX ORDER — DULAGLUTIDE 1.5 MG/.5ML
INJECTION, SOLUTION SUBCUTANEOUS
Qty: 6 ML | Refills: 1 | Status: SHIPPED | OUTPATIENT
Start: 2021-01-04 | End: 2021-06-23

## 2021-01-04 NOTE — TELEPHONE ENCOUNTER
Trulicity:  [birth year]    Future appt:    Last Appointment with provider:   12/9/2020  Last appointment at [facility]:  12/9/2020  Cholesterol, Total (mg/dL)   Date Value   11/26/2019 174     HDL Cholesterol (mg/dL)   Date Value   11/26/2019 64     LDL Chol

## 2021-03-17 DIAGNOSIS — Z23 NEED FOR VACCINATION: ICD-10-CM

## 2021-04-29 PROBLEM — Z12.31 BREAST CANCER SCREENING BY MAMMOGRAM: Status: ACTIVE | Noted: 2019-12-09

## 2021-06-22 DIAGNOSIS — I10 BENIGN ESSENTIAL HYPERTENSION: ICD-10-CM

## 2021-06-22 DIAGNOSIS — E11.9 TYPE 2 DIABETES MELLITUS WITHOUT COMPLICATION, WITHOUT LONG-TERM CURRENT USE OF INSULIN (HCC): ICD-10-CM

## 2021-06-23 ENCOUNTER — TELEPHONE (OUTPATIENT)
Dept: FAMILY MEDICINE CLINIC | Facility: CLINIC | Age: 56
End: 2021-06-23

## 2021-06-23 DIAGNOSIS — D63.0 ANEMIA IN NEOPLASTIC DISEASE: ICD-10-CM

## 2021-06-23 DIAGNOSIS — E11.9 TYPE 2 DIABETES MELLITUS WITHOUT COMPLICATION, WITHOUT LONG-TERM CURRENT USE OF INSULIN (HCC): ICD-10-CM

## 2021-06-23 DIAGNOSIS — I10 BENIGN ESSENTIAL HYPERTENSION: Primary | ICD-10-CM

## 2021-06-23 RX ORDER — LISINOPRIL 10 MG/1
TABLET ORAL
Qty: 90 TABLET | Refills: 1 | Status: SHIPPED | OUTPATIENT
Start: 2021-06-23 | End: 2021-12-17

## 2021-06-23 RX ORDER — DULAGLUTIDE 1.5 MG/.5ML
INJECTION, SOLUTION SUBCUTANEOUS
Qty: 6 ML | Refills: 1 | Status: SHIPPED | OUTPATIENT
Start: 2021-06-23 | End: 2021-11-22

## 2021-06-23 NOTE — TELEPHONE ENCOUNTER
has appt Friday  for DM ck    needs BW appt tomorrow     ( no orders in [facility] )     had BW on Monday for oncologist at [address]  will this suffice ?     please advise      Future Appointments   Date Time Provider Sheila Booker   6/25/2021  3:30 PM Milagro Dc

## 2021-06-23 NOTE — TELEPHONE ENCOUNTER
Trulicity: 6/5/20       Return in about 6 months (around 6/9/2021).   Future appt:    Last Appointment with provider:  12/9/2020  Last appointment at [facility]:  Visit date not found  Cholesterol, Total (mg/dL)   Date Value   11/26/2019 174     HDL Skyla

## 2021-06-23 NOTE — TELEPHONE ENCOUNTER
Patient scheduled an appt.      Future Appointments   Date Time Provider Sheila Ade   6/24/2021  8:30 AM REF [place] [facility]   6/25/2021  3:30 PM Lela Estrada MD [facility] [place] [facility]

## 2021-06-23 NOTE — TELEPHONE ENCOUNTER
Future appt:     Last Appointment with provider:  12/9/2020;Return in about 6 months (around 6/9/2021).      Last appointment at [facility]:  Visit date not found  Cholesterol, Total (mg/dL)   Date Value   11/26/2019 174     HDL Cholesterol (mg/dL)   Date

## 2021-06-24 ENCOUNTER — LABORATORY ENCOUNTER (OUTPATIENT)
Dept: LAB | Age: 56
End: 2021-06-24
Attending: FAMILY MEDICINE
Payer: COMMERCIAL

## 2021-06-24 DIAGNOSIS — I10 BENIGN ESSENTIAL HYPERTENSION: ICD-10-CM

## 2021-06-24 DIAGNOSIS — D63.0 ANEMIA IN NEOPLASTIC DISEASE: ICD-10-CM

## 2021-06-24 DIAGNOSIS — E11.9 TYPE 2 DIABETES MELLITUS WITHOUT COMPLICATION, WITHOUT LONG-TERM CURRENT USE OF INSULIN (HCC): ICD-10-CM

## 2021-06-24 PROCEDURE — 84550 ASSAY OF BLOOD/URIC ACID: CPT | Performed by: FAMILY MEDICINE

## 2021-06-24 PROCEDURE — 3061F NEG MICROALBUMINURIA REV: CPT | Performed by: FAMILY MEDICINE

## 2021-06-24 PROCEDURE — 82043 UR ALBUMIN QUANTITATIVE: CPT | Performed by: FAMILY MEDICINE

## 2021-06-24 PROCEDURE — 82570 ASSAY OF URINE CREATININE: CPT | Performed by: FAMILY MEDICINE

## 2021-06-24 PROCEDURE — 83036 HEMOGLOBIN GLYCOSYLATED A1C: CPT | Performed by: FAMILY MEDICINE

## 2021-06-24 PROCEDURE — 80061 LIPID PANEL: CPT | Performed by: FAMILY MEDICINE

## 2021-06-24 PROCEDURE — 84443 ASSAY THYROID STIM HORMONE: CPT | Performed by: FAMILY MEDICINE

## 2021-06-25 ENCOUNTER — OFFICE VISIT (OUTPATIENT)
Dept: FAMILY MEDICINE CLINIC | Facility: CLINIC | Age: 56
End: 2021-06-25
Payer: COMMERCIAL

## 2021-06-25 VITALS
HEIGHT: 63 IN | DIASTOLIC BLOOD PRESSURE: 70 MMHG | BODY MASS INDEX: 41.93 KG/M2 | HEART RATE: 67 BPM | OXYGEN SATURATION: 100 % | WEIGHT: 236.63 LBS | RESPIRATION RATE: 16 BRPM | SYSTOLIC BLOOD PRESSURE: 112 MMHG | TEMPERATURE: 98 F

## 2021-06-25 DIAGNOSIS — Z12.11 SCREENING FOR COLON CANCER: ICD-10-CM

## 2021-06-25 DIAGNOSIS — I10 BENIGN ESSENTIAL HYPERTENSION: ICD-10-CM

## 2021-06-25 DIAGNOSIS — E11.9 TYPE 2 DIABETES MELLITUS WITHOUT COMPLICATION, WITHOUT LONG-TERM CURRENT USE OF INSULIN (HCC): Primary | ICD-10-CM

## 2021-06-25 PROCEDURE — 99214 OFFICE O/P EST MOD 30 MIN: CPT | Performed by: FAMILY MEDICINE

## 2021-06-25 PROCEDURE — 3074F SYST BP LT 130 MM HG: CPT | Performed by: FAMILY MEDICINE

## 2021-06-25 PROCEDURE — 3078F DIAST BP <80 MM HG: CPT | Performed by: FAMILY MEDICINE

## 2021-06-25 PROCEDURE — 3008F BODY MASS INDEX DOCD: CPT | Performed by: FAMILY MEDICINE

## 2021-06-25 NOTE — PROGRESS NOTES
[address]  PROGRESS NOTE  Chief Complaint:   Patient presents with: Follow - Up  Diabetes      HPI:   This is a 48year old female coming in for follow-up of her diabetes. She reports that she is doing well overall.   She continues to 6/18/2013    Overview:  Stage IIIA, [id number], ER+ Her2 negative left breast cancer s/p MRM, dose dense AC x 4 then Taxol x 4, then RT, then tamoxifen since October 2007. Plan routine surveillance. Pt. stopped Tamoxifen early Sept 2012.   She would have com MG Oral Tab Take 1 mg by mouth every 4 (four) hours as needed for Anxiety. • aspirin 325 MG Oral Tab EC Take 325 mg by mouth daily. • VERZENIO 100 MG Oral Tab Take 100 mg by mouth 2 (two) times daily.     6   • clotrimazole 10 MG Mouth/Throat Juan diarrhea, or blood in stool. MUSCULOSKELETAL:  Denies weakness, muscle aches, back pain, joint pain, swelling or stiffness.   NEUROLOGICAL:  Denies headache, seizures, dizziness, syncope, paralysis, ataxia, numbness or tingling in the extremities,change in sensory intact. Bilateral barefoot skin diabetic exam is normal, visualized feet and the appearance is normal.  Bilateral monofilament/sensation of both feet is normal.  Pulsation pedal pulse exam of both lower legs/feet is normal as well.       ASSESSMENT cyst     Psoriasis     Elevated tumor markers     Claustrophobia     Hip pain, chronic, left     Malignant neoplasm metastatic to right lung [facility])     Secondary malignant neoplasm of bone ([facility])     Metastasis to skin ([facility])     Pleural effusion, malignant

## 2021-07-05 ENCOUNTER — PATIENT MESSAGE (OUTPATIENT)
Dept: FAMILY MEDICINE CLINIC | Facility: CLINIC | Age: 56
End: 2021-07-05

## 2021-07-06 NOTE — TELEPHONE ENCOUNTER
Future appt:     Last Appointment with provider:   6/25/2021- advised Return in about 6 months (around 12/25/2021).     Last refill: 12/9/20- clotrimazole-betamethasone 1-0.05%  Cholesterol, Total (mg/dL)   Date Value   06/24/2021 185   11/26/2019 174     H

## 2021-07-06 NOTE — TELEPHONE ENCOUNTER
From: Panfilo Johansen  To: Clari Bowie MD  Sent: 7/5/2021 10:58 AM CDT  Subject: Other    My neck and under my jaw are so sore I can barely swallow, talk, sleep, eat. My sinus passage on my whole left side is swollen under my eye, ear, and neck.

## 2021-07-06 NOTE — TELEPHONE ENCOUNTER
Dr. Humphrey Chew is out of the office today. Recommend an appointment for evaluation. Please let patient know. Thank you.

## 2021-07-07 RX ORDER — CLOTRIMAZOLE AND BETAMETHASONE DIPROPIONATE 10; .64 MG/G; MG/G
1 CREAM TOPICAL 2 TIMES DAILY PRN
Qty: 30 G | Refills: 0 | Status: SHIPPED | OUTPATIENT
Start: 2021-07-07 | End: 2021-12-28

## 2021-07-07 NOTE — TELEPHONE ENCOUNTER
Future appt:     Last Appointment with provider:   6/25/2021; Return in about 6 months (around 12/25/2021).     Last appointment at [facility] [place]:  6/25/2021  Cholesterol, Total (mg/dL)   Date Value   06/24/2021 185   11/26/2019 174     HDL Cholesterol (mg/d

## 2021-07-13 ENCOUNTER — PATIENT MESSAGE (OUTPATIENT)
Dept: FAMILY MEDICINE CLINIC | Facility: CLINIC | Age: 56
End: 2021-07-13

## 2021-07-13 NOTE — TELEPHONE ENCOUNTER
From: Fabrice Marrero  To: Roberta Hoyt MD  Sent: 7/13/2021 2:35 PM CDT  Subject: Other    I just received a text stating Dr. Zilphia Krabbe recommends I schedule a mammogram. [facility] shows last mammo dated 2016.  Please correct your records to show

## 2021-08-10 ENCOUNTER — LAB ENCOUNTER (OUTPATIENT)
Dept: LAB | Age: 56
End: 2021-08-10
Attending: FAMILY MEDICINE
Payer: COMMERCIAL

## 2021-08-10 DIAGNOSIS — Z12.11 SCREENING FOR COLON CANCER: ICD-10-CM

## 2021-08-10 PROCEDURE — 82274 ASSAY TEST FOR BLOOD FECAL: CPT | Performed by: FAMILY MEDICINE

## 2021-08-11 ENCOUNTER — TELEPHONE (OUTPATIENT)
Dept: FAMILY MEDICINE CLINIC | Facility: CLINIC | Age: 56
End: 2021-08-11

## 2021-08-11 DIAGNOSIS — R19.5 POSITIVE FIT (FECAL IMMUNOCHEMICAL TEST): Primary | ICD-10-CM

## 2021-08-11 LAB — HEMOCCULT STL QL: POSITIVE

## 2021-08-12 ENCOUNTER — TELEPHONE (OUTPATIENT)
Dept: FAMILY MEDICINE CLINIC | Facility: CLINIC | Age: 56
End: 2021-08-12

## 2021-08-12 NOTE — TELEPHONE ENCOUNTER
----- Message from Nely Winston MD sent at 8/11/2021  7:15 PM CDT -----  Stool FIT testing is positive. The next step is to schedule colonoscopy. I will put in a referral to Dr. Billie Anderson.

## 2021-08-12 NOTE — TELEPHONE ENCOUNTER
[employer] message sent by Dr. Cristie Cranker. Referral faxed to Dr. Meredith Steward.

## 2021-08-12 NOTE — TELEPHONE ENCOUNTER
Stool FIT testing is positive. The next step is to schedule colonoscopy. I will put in a referral to Dr. Marleny Lane.

## 2021-11-01 ENCOUNTER — TELEPHONE (OUTPATIENT)
Dept: FAMILY MEDICINE CLINIC | Facility: CLINIC | Age: 56
End: 2021-11-01

## 2021-11-22 RX ORDER — DULAGLUTIDE 1.5 MG/.5ML
INJECTION, SOLUTION SUBCUTANEOUS
Qty: 6 ML | Refills: 1 | Status: SHIPPED | OUTPATIENT
Start: 2021-11-22

## 2021-11-22 NOTE — TELEPHONE ENCOUNTER
Trulicity: [birth year]     Return in about 6 months (around 12/25/2021). Future appt:     Your appointments     Date & Time Appointment Department [facility])    Dec 28, 2021 10:30 AM CST Physical - Established with MD Tye Victoria [address], S

## 2021-12-15 DIAGNOSIS — I10 BENIGN ESSENTIAL HYPERTENSION: ICD-10-CM

## 2021-12-15 DIAGNOSIS — E11.9 TYPE 2 DIABETES MELLITUS WITHOUT COMPLICATION, WITHOUT LONG-TERM CURRENT USE OF INSULIN (HCC): ICD-10-CM

## 2021-12-16 NOTE — TELEPHONE ENCOUNTER
Future appt: Your appointments     Date & Time Appointment Department [facility])    Dec 28, 2021 10:30 AM CST Physical - Established with Kathren Severe, MD [address] [facility] ([facility])            25 [facility]  [address] [id number]  [phone]        Last Appointment with provider:   6/25/2021 for diabetes follow up. Last appointment at [facility]:  6/25/2021  Cholesterol, Total (mg/dL)   Date Value   06/24/2021 185   11/26/2019 174     HDL Cholesterol (mg/dL)   Date Value   06/24/2021 63 (H)   11/26/2019 64     LDL Cholesterol (mg/dL)   Date Value   06/24/2021 101 (H)   11/26/2019 90     Triglycerides (mg/dL)   Date Value   06/24/2021 120   11/26/2019 102     Lab Results   Component Value Date     06/24/2021    A1C 5.7 (H) 06/24/2021     Lab Results   Component Value Date    TSH 3.860 (H) 06/24/2021       No follow-ups on file.

## 2021-12-17 RX ORDER — LISINOPRIL 10 MG/1
TABLET ORAL
Qty: 90 TABLET | Refills: 1 | Status: SHIPPED | OUTPATIENT
Start: 2021-12-17 | End: 2022-07-13

## 2021-12-28 ENCOUNTER — OFFICE VISIT (OUTPATIENT)
Dept: FAMILY MEDICINE CLINIC | Facility: CLINIC | Age: 56
End: 2021-12-28
Payer: COMMERCIAL

## 2021-12-28 VITALS
HEART RATE: 100 BPM | HEIGHT: 63 IN | SYSTOLIC BLOOD PRESSURE: 134 MMHG | WEIGHT: 237.63 LBS | RESPIRATION RATE: 16 BRPM | TEMPERATURE: 99 F | DIASTOLIC BLOOD PRESSURE: 78 MMHG | BODY MASS INDEX: 42.11 KG/M2

## 2021-12-28 DIAGNOSIS — Z80.9 FAMILY HISTORY OF CANCER: ICD-10-CM

## 2021-12-28 DIAGNOSIS — C79.31 SECONDARY MALIGNANT NEOPLASM OF BRAIN (HCC): ICD-10-CM

## 2021-12-28 DIAGNOSIS — J91.0 PLEURAL EFFUSION, MALIGNANT: ICD-10-CM

## 2021-12-28 DIAGNOSIS — F40.240 CLAUSTROPHOBIA: ICD-10-CM

## 2021-12-28 DIAGNOSIS — I10 BENIGN ESSENTIAL HYPERTENSION: ICD-10-CM

## 2021-12-28 DIAGNOSIS — E11.9 TYPE 2 DIABETES MELLITUS WITHOUT COMPLICATION, WITHOUT LONG-TERM CURRENT USE OF INSULIN (HCC): ICD-10-CM

## 2021-12-28 DIAGNOSIS — G93.89 BRAIN MASS: ICD-10-CM

## 2021-12-28 DIAGNOSIS — G81.01 FLACCID HEMIPLEGIA OF RIGHT DOMINANT SIDE DUE TO NONCEREBROVASCULAR ETIOLOGY (HCC): ICD-10-CM

## 2021-12-28 DIAGNOSIS — I89.0 LYMPHEDEMA: ICD-10-CM

## 2021-12-28 DIAGNOSIS — K59.1 FUNCTIONAL DIARRHEA: ICD-10-CM

## 2021-12-28 DIAGNOSIS — C79.51 SECONDARY MALIGNANT NEOPLASM OF BONE (HCC): ICD-10-CM

## 2021-12-28 DIAGNOSIS — L20.84 INTRINSIC ECZEMA: ICD-10-CM

## 2021-12-28 DIAGNOSIS — I89.0 LYMPHEDEMA OF LEFT ARM: ICD-10-CM

## 2021-12-28 DIAGNOSIS — C79.2 METASTASIS TO SKIN (HCC): ICD-10-CM

## 2021-12-28 DIAGNOSIS — E66.01 CLASS 3 SEVERE OBESITY DUE TO EXCESS CALORIES WITH SERIOUS COMORBIDITY AND BODY MASS INDEX (BMI) OF 40.0 TO 44.9 IN ADULT (HCC): ICD-10-CM

## 2021-12-28 DIAGNOSIS — C50.412 MALIGNANT NEOPLASM OF UPPER-OUTER QUADRANT OF LEFT FEMALE BREAST, UNSPECIFIED ESTROGEN RECEPTOR STATUS (HCC): ICD-10-CM

## 2021-12-28 DIAGNOSIS — G89.29 HIP PAIN, CHRONIC, LEFT: ICD-10-CM

## 2021-12-28 DIAGNOSIS — C78.01 MALIGNANT NEOPLASM METASTATIC TO RIGHT LUNG (HCC): ICD-10-CM

## 2021-12-28 DIAGNOSIS — R97.8 ELEVATED TUMOR MARKERS: ICD-10-CM

## 2021-12-28 DIAGNOSIS — R19.5 HEME POSITIVE STOOL: ICD-10-CM

## 2021-12-28 DIAGNOSIS — Z00.00 HEALTHCARE MAINTENANCE: Primary | ICD-10-CM

## 2021-12-28 DIAGNOSIS — D50.0 IRON DEFICIENCY ANEMIA DUE TO CHRONIC BLOOD LOSS: ICD-10-CM

## 2021-12-28 DIAGNOSIS — Z86.718 HISTORY OF DEEP VENOUS THROMBOSIS (DVT) OF DISTAL VEIN OF LEFT LOWER EXTREMITY: ICD-10-CM

## 2021-12-28 DIAGNOSIS — M15.9 PRIMARY OSTEOARTHRITIS INVOLVING MULTIPLE JOINTS: ICD-10-CM

## 2021-12-28 DIAGNOSIS — L40.9 PSORIASIS: ICD-10-CM

## 2021-12-28 DIAGNOSIS — M25.552 HIP PAIN, CHRONIC, LEFT: ICD-10-CM

## 2021-12-28 PROBLEM — B37.31 YEAST VAGINITIS: Status: RESOLVED | Noted: 2018-02-20 | Resolved: 2021-12-28

## 2021-12-28 PROBLEM — N76.1 SUBACUTE VAGINITIS: Status: RESOLVED | Noted: 2020-07-15 | Resolved: 2021-12-28

## 2021-12-28 PROBLEM — K12.31 MUCOSITIS DUE TO CHEMOTHERAPY: Status: RESOLVED | Noted: 2020-07-15 | Resolved: 2021-12-28

## 2021-12-28 PROBLEM — B37.3 YEAST VAGINITIS: Status: RESOLVED | Noted: 2018-02-20 | Resolved: 2021-12-28

## 2021-12-28 PROBLEM — E83.42 HYPOMAGNESEMIA: Status: RESOLVED | Noted: 2018-10-02 | Resolved: 2021-12-28

## 2021-12-28 PROBLEM — R06.02 SHORTNESS OF BREATH: Status: RESOLVED | Noted: 2018-08-17 | Resolved: 2021-12-28

## 2021-12-28 PROBLEM — Z12.31 BREAST CANCER SCREENING BY MAMMOGRAM: Status: RESOLVED | Noted: 2019-12-09 | Resolved: 2021-12-28

## 2021-12-28 PROBLEM — J90 PLEURAL EFFUSION, RIGHT: Status: RESOLVED | Noted: 2018-01-26 | Resolved: 2021-12-28

## 2021-12-28 PROBLEM — D63.0 ANEMIA IN NEOPLASTIC DISEASE: Status: RESOLVED | Noted: 2020-06-15 | Resolved: 2021-12-28

## 2021-12-28 PROBLEM — Z98.890 H/O KNEE SURGERY: Status: RESOLVED | Noted: 2017-02-01 | Resolved: 2021-12-28

## 2021-12-28 PROBLEM — Z13.79 GENETIC TESTING: Status: RESOLVED | Noted: 2019-07-22 | Resolved: 2021-12-28

## 2021-12-28 PROBLEM — Z98.890 HISTORY OF DILATION AND CURETTAGE: Status: RESOLVED | Noted: 2017-06-07 | Resolved: 2021-12-28

## 2021-12-28 PROCEDURE — 3008F BODY MASS INDEX DOCD: CPT | Performed by: FAMILY MEDICINE

## 2021-12-28 PROCEDURE — 99396 PREV VISIT EST AGE 40-64: CPT | Performed by: FAMILY MEDICINE

## 2021-12-28 PROCEDURE — 3044F HG A1C LEVEL LT 7.0%: CPT | Performed by: FAMILY MEDICINE

## 2021-12-28 PROCEDURE — 99213 OFFICE O/P EST LOW 20 MIN: CPT | Performed by: FAMILY MEDICINE

## 2021-12-28 PROCEDURE — 83036 HEMOGLOBIN GLYCOSYLATED A1C: CPT | Performed by: FAMILY MEDICINE

## 2021-12-28 PROCEDURE — 3075F SYST BP GE 130 - 139MM HG: CPT | Performed by: FAMILY MEDICINE

## 2021-12-28 PROCEDURE — 3078F DIAST BP <80 MM HG: CPT | Performed by: FAMILY MEDICINE

## 2021-12-28 RX ORDER — CLOTRIMAZOLE AND BETAMETHASONE DIPROPIONATE 10; .64 MG/G; MG/G
1 CREAM TOPICAL 2 TIMES DAILY PRN
Qty: 30 G | Refills: 3 | Status: SHIPPED | OUTPATIENT
Start: 2021-12-28

## 2021-12-28 RX ORDER — LORAZEPAM 1 MG/1
1 TABLET ORAL EVERY 4 HOURS PRN
COMMUNITY
Start: 2021-06-29

## 2021-12-28 RX ORDER — ASCORBIC ACID 500 MG
500 TABLET ORAL
COMMUNITY

## 2021-12-28 RX ORDER — FERROUS SULFATE 325(65) MG
325 TABLET ORAL
COMMUNITY

## 2021-12-28 NOTE — PATIENT INSTRUCTIONS
Please schedule the Covid booster when available. Continue the same medications. Recheck in 6 months.

## 2022-04-06 ENCOUNTER — TELEPHONE (OUTPATIENT)
Dept: FAMILY MEDICINE CLINIC | Facility: CLINIC | Age: 57
End: 2022-04-06

## 2022-04-06 NOTE — TELEPHONE ENCOUNTER
Please take the Trulicity at the usual dose. Trulicity does not tend to cause low blood sugar even if just doing liquids.

## 2022-04-06 NOTE — TELEPHONE ENCOUNTER
Patient states she takes Trulicity every Sunday for her Diabetes. States on Sunday, 4/10/2022 she will be on an all liquid diet for her Colonoscopy on 4/11/2022. Patient wondering if she should still take her Trulicity like normal?  Please advise.

## 2022-05-24 RX ORDER — DULAGLUTIDE 1.5 MG/.5ML
INJECTION, SOLUTION SUBCUTANEOUS
Qty: 6 ML | Refills: 3 | Status: SHIPPED | OUTPATIENT
Start: 2022-05-24

## 2022-05-24 NOTE — TELEPHONE ENCOUNTER
Future appt: Your appointments     Date & Time Appointment Department [facility])    Jun 28, 2022  9:00 AM CDT Follow up - Extended with Qing Wheat MD 25 [facility] ([facility])            25 [facility]  [phone]        Last Appointment with provider:   Visit date not found  Last appointment at [facility]:  12/28/2021 with Dr. Gogo Arias for Annual Physical and plan for recheck in 6 months. Cholesterol, Total (mg/dL)   Date Value   06/24/2021 185   11/26/2019 174     HDL Cholesterol (mg/dL)   Date Value   06/24/2021 63 (H)   11/26/2019 64     LDL Cholesterol (mg/dL)   Date Value   06/24/2021 101 (H)   11/26/2019 90     Triglycerides (mg/dL)   Date Value   06/24/2021 120   11/26/2019 102     Lab Results   Component Value Date     06/24/2021    A1C 5.5 12/28/2021     Lab Results   Component Value Date    TSH 3.860 (H) 06/24/2021       No follow-ups on file.

## 2022-06-06 ENCOUNTER — PATIENT MESSAGE (OUTPATIENT)
Dept: FAMILY MEDICINE CLINIC | Facility: CLINIC | Age: 57
End: 2022-06-06

## 2022-06-06 DIAGNOSIS — E11.9 TYPE 2 DIABETES MELLITUS WITHOUT COMPLICATION, WITHOUT LONG-TERM CURRENT USE OF INSULIN (HCC): Primary | ICD-10-CM

## 2022-06-06 NOTE — TELEPHONE ENCOUNTER
From: Gaby Wynn  To: Usama Bauer MD  Sent: 6/6/2022 4:38 PM CDT  Subject: Fax # for Yinabear Antonio     This is the fax number for [facility]   [phone]. Please send A1C order.  Thanks

## 2022-06-20 PROCEDURE — 3061F NEG MICROALBUMINURIA REV: CPT | Performed by: FAMILY MEDICINE

## 2022-06-20 PROCEDURE — 3044F HG A1C LEVEL LT 7.0%: CPT | Performed by: FAMILY MEDICINE

## 2022-06-21 ENCOUNTER — TELEPHONE (OUTPATIENT)
Dept: FAMILY MEDICINE CLINIC | Facility: CLINIC | Age: 57
End: 2022-06-21

## 2022-06-21 DIAGNOSIS — E11.9 TYPE 2 DIABETES MELLITUS WITHOUT COMPLICATION, WITHOUT LONG-TERM CURRENT USE OF INSULIN (HCC): ICD-10-CM

## 2022-06-21 LAB
CHOL/HDL RATIO: 3.7
CHOLESTEROL, TOTAL: 215 MG/DL
CREATININE, URINE: 138 MG/DL
HDL CHOLESTEROL: 58 MG/DL
HEMOGLOBIN A1C: 5.2
LDL CHOLESTEROL: 126 MG/DL (ref ?–130)
MICROALB/CREAT RATIO: 16 UG/MG CREAT
MICROALBUM.,U,RANDOM: 2.2 UG/ML
NON HDL CHOL: 157
TRIGLYCERIDES: 153 MG/DL

## 2022-06-21 NOTE — TELEPHONE ENCOUNTER
Office appointment is coming up next week. Cholesterol is 215. This is slightly elevated. HDL cholesterol is normal.  Hemoglobin A1c is excellent at 5.2. Microalbumin to creatinine ratio was normal.    We will discuss these labs again at her appointment.

## 2022-06-28 ENCOUNTER — OFFICE VISIT (OUTPATIENT)
Dept: FAMILY MEDICINE CLINIC | Facility: CLINIC | Age: 57
End: 2022-06-28
Payer: COMMERCIAL

## 2022-06-28 VITALS
TEMPERATURE: 99 F | SYSTOLIC BLOOD PRESSURE: 116 MMHG | BODY MASS INDEX: 40.93 KG/M2 | HEIGHT: 63 IN | RESPIRATION RATE: 16 BRPM | DIASTOLIC BLOOD PRESSURE: 76 MMHG | HEART RATE: 80 BPM | WEIGHT: 231 LBS

## 2022-06-28 DIAGNOSIS — L20.84 INTRINSIC ECZEMA: ICD-10-CM

## 2022-06-28 DIAGNOSIS — I10 BENIGN ESSENTIAL HYPERTENSION: ICD-10-CM

## 2022-06-28 DIAGNOSIS — C78.01 MALIGNANT NEOPLASM METASTATIC TO RIGHT LUNG (HCC): ICD-10-CM

## 2022-06-28 DIAGNOSIS — E11.9 TYPE 2 DIABETES MELLITUS WITHOUT COMPLICATION, WITHOUT LONG-TERM CURRENT USE OF INSULIN (HCC): Primary | ICD-10-CM

## 2022-06-28 DIAGNOSIS — J91.0 PLEURAL EFFUSION, MALIGNANT: ICD-10-CM

## 2022-06-28 DIAGNOSIS — C79.2 METASTASIS TO SKIN (HCC): ICD-10-CM

## 2022-06-28 DIAGNOSIS — G93.89 BRAIN MASS: ICD-10-CM

## 2022-06-28 DIAGNOSIS — C79.31 SECONDARY MALIGNANT NEOPLASM OF BRAIN (HCC): ICD-10-CM

## 2022-06-28 DIAGNOSIS — Z86.718 HISTORY OF DEEP VENOUS THROMBOSIS (DVT) OF DISTAL VEIN OF LEFT LOWER EXTREMITY: ICD-10-CM

## 2022-06-28 DIAGNOSIS — M15.9 PRIMARY OSTEOARTHRITIS INVOLVING MULTIPLE JOINTS: ICD-10-CM

## 2022-06-28 DIAGNOSIS — E66.01 CLASS 3 SEVERE OBESITY DUE TO EXCESS CALORIES WITH SERIOUS COMORBIDITY AND BODY MASS INDEX (BMI) OF 40.0 TO 44.9 IN ADULT (HCC): ICD-10-CM

## 2022-06-28 DIAGNOSIS — C50.412 MALIGNANT NEOPLASM OF UPPER-OUTER QUADRANT OF LEFT FEMALE BREAST, UNSPECIFIED ESTROGEN RECEPTOR STATUS (HCC): ICD-10-CM

## 2022-06-28 DIAGNOSIS — K59.1 FUNCTIONAL DIARRHEA: ICD-10-CM

## 2022-06-28 DIAGNOSIS — C79.51 SECONDARY MALIGNANT NEOPLASM OF BONE (HCC): ICD-10-CM

## 2022-06-28 DIAGNOSIS — I89.0 LYMPHEDEMA OF LEFT ARM: ICD-10-CM

## 2022-06-28 PROBLEM — M25.552 HIP PAIN, CHRONIC, LEFT: Status: RESOLVED | Noted: 2018-05-10 | Resolved: 2022-06-28

## 2022-06-28 PROBLEM — R19.5 HEME POSITIVE STOOL: Status: RESOLVED | Noted: 2021-08-19 | Resolved: 2022-06-28

## 2022-06-28 PROBLEM — Z00.00 HEALTHCARE MAINTENANCE: Status: RESOLVED | Noted: 2021-12-28 | Resolved: 2022-06-28

## 2022-06-28 PROBLEM — G81.01 FLACCID HEMIPLEGIA OF RIGHT DOMINANT SIDE DUE TO NONCEREBROVASCULAR ETIOLOGY (HCC): Status: RESOLVED | Noted: 2018-06-12 | Resolved: 2022-06-28

## 2022-06-28 PROBLEM — G89.29 HIP PAIN, CHRONIC, LEFT: Status: RESOLVED | Noted: 2018-05-10 | Resolved: 2022-06-28

## 2022-06-28 LAB
ALBUMIN: 3.9 G/DL
ALKALINE PHOSPHATASE, SERUM: 84 IU/L
ALT (SGPT): 15 IU/L
ANION GAP: 8
AST (SGOT): 19 IU/L
BILIRUBIN, TOTAL: 0.5 MG/DL
BUN: 21
CALCIUM, SERUM: 10.5 MG/DL
CHLORIDE, SERUM: 103
CO2: 28
CREATININE, SERUM: 1.1
EGFR IF NONAFRICN AM: 59
FERRITIN, SERUM: 68 NG/ML
GLUCOSE, SERUM: 85 MG/DL
HCT: 31.9
HGB: 10.5 G/DL
IRON BIND.CAP.(TIBC): 378 ΜG/DL
IRON SATURATION: 36 %
IRON, SERUM: 137 ΜG/DL
MAGNESIUM, SERUM: 1.7 MG/DL
MCH: 36 PG
MCHC: 33 G/DL
MCV: 110 FL
MPV: 9.2
PLATELET COUNT: 127
POTASSIUM, SERUM: 4.6
RBC: 2.9 X 10-6/UL
RDW: 14 %
SODIUM, SERUM: 139
TOTAL PROTEIN,SERUM: 6.9
TRANSFERRIN: 270 MG/DL
WBC: 3.2

## 2022-06-28 PROCEDURE — 3074F SYST BP LT 130 MM HG: CPT | Performed by: FAMILY MEDICINE

## 2022-06-28 PROCEDURE — 3008F BODY MASS INDEX DOCD: CPT | Performed by: FAMILY MEDICINE

## 2022-06-28 PROCEDURE — 3078F DIAST BP <80 MM HG: CPT | Performed by: FAMILY MEDICINE

## 2022-06-28 PROCEDURE — 99214 OFFICE O/P EST MOD 30 MIN: CPT | Performed by: FAMILY MEDICINE

## 2022-06-28 RX ORDER — ATORVASTATIN CALCIUM 10 MG/1
10 TABLET, FILM COATED ORAL NIGHTLY
Qty: 90 TABLET | Refills: 3 | Status: SHIPPED | OUTPATIENT
Start: 2022-06-28

## 2022-07-13 DIAGNOSIS — I10 BENIGN ESSENTIAL HYPERTENSION: ICD-10-CM

## 2022-07-13 DIAGNOSIS — E11.9 TYPE 2 DIABETES MELLITUS WITHOUT COMPLICATION, WITHOUT LONG-TERM CURRENT USE OF INSULIN (HCC): ICD-10-CM

## 2022-07-13 RX ORDER — LISINOPRIL 10 MG/1
TABLET ORAL
Qty: 90 TABLET | Refills: 1 | Status: SHIPPED | OUTPATIENT
Start: 2022-07-13

## 2022-07-13 NOTE — TELEPHONE ENCOUNTER
Lisinopril/Metformin: 12/17/21    Future appt: Your appointments     Date & Time Appointment Department [facility])    Dec 28, 2022  2:15 PM CST Physical - Established with Kadie Sotomayor MD [address] [facility], [facility] ([facility] [place]  [id number]  [phone]        Last Appointment with provider:   6/28/2022  Last appointment at [facility] [place]:  6/28/2022  Cholesterol, Total (mg/dL)   Date Value   06/20/2022 215     HDL Cholesterol (mg/dL)   Date Value   06/20/2022 58     LDL Cholesterol (mg/dL)   Date Value   06/20/2022 126     Triglycerides (mg/dL)   Date Value   06/20/2022 153   06/24/2021 120     Lab Results   Component Value Date     06/24/2021    A1C 5.2 06/20/2022     Lab Results   Component Value Date    TSH 3.860 (H) 06/24/2021       No follow-ups on file.

## 2022-10-07 ENCOUNTER — PATIENT MESSAGE (OUTPATIENT)
Dept: FAMILY MEDICINE CLINIC | Facility: CLINIC | Age: 57
End: 2022-10-07

## 2022-10-13 NOTE — TELEPHONE ENCOUNTER
Future appt: Your appointments     Date & Time Appointment Department [facility])    Dec 28, 2022  2:15 PM CST Physical - Established with Suzy Bonilla MD [address] [place] [address] Center  [phone]  [phone]        Last Appointment with provider:   6/28/2022  Last appointment at [facility]:  6/28/2022  Cholesterol, Total (mg/dL)   Date Value   06/20/2022 215     HDL Cholesterol (mg/dL)   Date Value   06/20/2022 58     LDL Cholesterol (mg/dL)   Date Value   06/20/2022 126     Triglycerides (mg/dL)   Date Value   06/20/2022 153   06/24/2021 120     Lab Results   Component Value Date     06/24/2021    A1C 5.2 06/20/2022     Lab Results   Component Value Date    TSH 3.860 (H) 06/24/2021       No follow-ups on file.

## 2022-10-14 RX ORDER — ATORVASTATIN CALCIUM 10 MG/1
10 TABLET, FILM COATED ORAL NIGHTLY
Qty: 90 TABLET | Refills: 1 | Status: SHIPPED | OUTPATIENT
Start: 2022-10-14

## 2022-12-27 ENCOUNTER — TELEPHONE (OUTPATIENT)
Dept: FAMILY MEDICINE CLINIC | Facility: CLINIC | Age: 57
End: 2022-12-27

## 2022-12-27 DIAGNOSIS — E11.9 TYPE 2 DIABETES MELLITUS WITHOUT COMPLICATION, WITHOUT LONG-TERM CURRENT USE OF INSULIN (HCC): Primary | ICD-10-CM

## 2022-12-27 LAB
CHOL/HDL RATIO: 2.6
CHOLESTEROL, TOTAL: 141 MG/DL
HDL CHOLESTEROL: 55 MG/DL
HEMOGLOBIN A1C: 4.9 %
LDL CHOLESTEROL: 63 MG/DL (ref ?–130)
MICROALBUM.,U,RANDOM: 4.8 UG/ML
NON HDL CHOL: 86
TRIGLYCERIDES: 113 MG/DL

## 2022-12-27 NOTE — TELEPHONE ENCOUNTER
Future appt: Your appointments     Date & Time Appointment Department [facility])    Dec 28, 2022  1:00 PM CST Physical - Established with Herberth Alvares, [address] [facility] ([facility])            [address] [phone]  [phone]        Last Appointment with provider:   Visit date not found  Last appointment at [facility]:  Visit date not found  Cholesterol, Total (mg/dL)   Date Value   06/20/2022 215     HDL Cholesterol (mg/dL)   Date Value   06/20/2022 58     LDL Cholesterol (mg/dL)   Date Value   06/20/2022 126     Triglycerides (mg/dL)   Date Value   06/20/2022 153   06/24/2021 120     Lab Results   Component Value Date     06/24/2021    A1C 5.2 06/20/2022     Lab Results   Component Value Date    TSH 3.860 (H) 06/24/2021       No follow-ups on file.

## 2022-12-27 NOTE — TELEPHONE ENCOUNTER
Labs reviewed. Her total cholesterol is excellent at 141. HDL cholesterol is good at 55. Hemoglobin A1c is very low at 4.9. The urine test was incorrectly ordered as urine microalbumin rather than urine microalbumin to creatinine ratio. Recommend ordering a urine microalbumin to creatinine ratio with her next lab testing.

## 2022-12-28 ENCOUNTER — OFFICE VISIT (OUTPATIENT)
Dept: FAMILY MEDICINE CLINIC | Facility: CLINIC | Age: 57
End: 2022-12-28
Payer: COMMERCIAL

## 2022-12-28 VITALS
WEIGHT: 222.38 LBS | HEART RATE: 78 BPM | HEIGHT: 63 IN | RESPIRATION RATE: 18 BRPM | OXYGEN SATURATION: 98 % | SYSTOLIC BLOOD PRESSURE: 128 MMHG | TEMPERATURE: 98 F | DIASTOLIC BLOOD PRESSURE: 72 MMHG | BODY MASS INDEX: 39.4 KG/M2

## 2022-12-28 DIAGNOSIS — Z23 NEED FOR TDAP VACCINATION: ICD-10-CM

## 2022-12-28 DIAGNOSIS — Z00.00 WELLNESS EXAMINATION: Primary | ICD-10-CM

## 2022-12-28 DIAGNOSIS — Z01.419 ENCOUNTER FOR WELL WOMAN EXAM: ICD-10-CM

## 2022-12-28 DIAGNOSIS — E11.9 TYPE 2 DIABETES MELLITUS WITHOUT COMPLICATION, WITHOUT LONG-TERM CURRENT USE OF INSULIN (HCC): ICD-10-CM

## 2022-12-28 LAB
CREAT UR-SCNC: 155 MG/DL
MICROALBUMIN UR-MCNC: 2.55 MG/DL
MICROALBUMIN/CREAT 24H UR-RTO: 16.5 UG/MG (ref ?–30)

## 2022-12-28 PROCEDURE — 3078F DIAST BP <80 MM HG: CPT | Performed by: NURSE PRACTITIONER

## 2022-12-28 PROCEDURE — 99396 PREV VISIT EST AGE 40-64: CPT | Performed by: NURSE PRACTITIONER

## 2022-12-28 PROCEDURE — 82043 UR ALBUMIN QUANTITATIVE: CPT | Performed by: FAMILY MEDICINE

## 2022-12-28 PROCEDURE — 90471 IMMUNIZATION ADMIN: CPT | Performed by: NURSE PRACTITIONER

## 2022-12-28 PROCEDURE — 82570 ASSAY OF URINE CREATININE: CPT | Performed by: FAMILY MEDICINE

## 2022-12-28 PROCEDURE — 87624 HPV HI-RISK TYP POOLED RSLT: CPT | Performed by: NURSE PRACTITIONER

## 2022-12-28 PROCEDURE — 90715 TDAP VACCINE 7 YRS/> IM: CPT | Performed by: NURSE PRACTITIONER

## 2022-12-28 PROCEDURE — 3008F BODY MASS INDEX DOCD: CPT | Performed by: NURSE PRACTITIONER

## 2022-12-28 PROCEDURE — 3074F SYST BP LT 130 MM HG: CPT | Performed by: NURSE PRACTITIONER

## 2022-12-29 LAB — HPV I/H RISK 1 DNA SPEC QL NAA+PROBE: NEGATIVE

## 2023-01-05 NOTE — PATIENT INSTRUCTIONS
We will get urine microalbumin today. Updated tetanus booster today. Pap pending. Continue current therapy. Recheck for diabetes and A1c in 6 months. Otherwise follow-up as needed.

## 2023-01-21 DIAGNOSIS — E11.9 TYPE 2 DIABETES MELLITUS WITHOUT COMPLICATION, WITHOUT LONG-TERM CURRENT USE OF INSULIN (HCC): ICD-10-CM

## 2023-01-21 DIAGNOSIS — I10 BENIGN ESSENTIAL HYPERTENSION: ICD-10-CM

## 2023-01-23 RX ORDER — LISINOPRIL 10 MG/1
TABLET ORAL
Qty: 90 TABLET | Refills: 3 | Status: SHIPPED | OUTPATIENT
Start: 2023-01-23

## 2023-01-23 NOTE — TELEPHONE ENCOUNTER
Lisinopril/Metformin: 7/13/22    Future appt: Your appointments     Date & Time Appointment Department [facility])    Jun 28, 2023  9:45 AM CDT Follow up - Extended with MD Ray Hernandez, Lieutenant Caballero ([facility])            Ray Pantoja, [facility]  [facility] [phone]  [phone]        Last Appointment with provider:   Visit date not found  Last appointment at [facility]:    12/28/2022 Wellness/Adriana APRN    Cholesterol, Total (mg/dL)   Date Value   12/22/2022 141     HDL Cholesterol (mg/dL)   Date Value   12/22/2022 55     LDL Cholesterol (mg/dL)   Date Value   12/22/2022 63     Triglycerides (mg/dL)   Date Value   12/22/2022 113   06/24/2021 120     Lab Results   Component Value Date     06/24/2021    A1C 4.9 12/22/2022     Lab Results   Component Value Date    TSH 3.860 (H) 06/24/2021       No follow-ups on file.

## 2023-04-17 RX ORDER — DULAGLUTIDE 1.5 MG/.5ML
INJECTION, SOLUTION SUBCUTANEOUS
Qty: 6 ML | Refills: 3 | Status: SHIPPED | OUTPATIENT
Start: 2023-04-17

## 2023-04-17 NOTE — TELEPHONE ENCOUNTER
Trulicity: [birth year]    Future appt: Your appointments     Date & Time Appointment Department [facility])    Jun 28, 2023  9:45 AM CDT Follow up - Extended with Lucinda Morataya MD [address] [place] [address], [facility] ([facility])            [address] [place] [address] [facility] [phone]  [phone]        Last Appointment with provider:   Visit date not found  Last appointment at [facility]:  12/28/2022  Cholesterol, Total (mg/dL)   Date Value   12/22/2022 141     HDL Cholesterol (mg/dL)   Date Value   12/22/2022 55     LDL Cholesterol (mg/dL)   Date Value   12/22/2022 63     Triglycerides (mg/dL)   Date Value   12/22/2022 113   06/24/2021 120     Lab Results   Component Value Date     06/24/2021    A1C 4.9 12/22/2022     Lab Results   Component Value Date    TSH 3.860 (H) 06/24/2021       No follow-ups on file.

## 2023-05-19 RX ORDER — ATORVASTATIN CALCIUM 10 MG/1
10 TABLET, FILM COATED ORAL NIGHTLY
Qty: 90 TABLET | Refills: 1 | Status: SHIPPED | OUTPATIENT
Start: 2023-05-19

## 2023-05-19 NOTE — TELEPHONE ENCOUNTER
Last Refill: 10/14/2022 #90 with 1 refill   Last Px: 12/28/2022    Future appt: Your appointments     Date & Time Appointment Department [facility])    Jun 28, 2023  9:45 AM CDT Follow up - Extended with Pinky Reynoso MD [address] [facility], Lin Ortiz ([facility])            [address] [facility], [facility] [place]  [address] [phone]  [phone]        Last Appointment with provider:   Visit date not found  Last appointment at [facility] [place]:  12/28/2022  Cholesterol, Total (mg/dL)   Date Value   12/22/2022 141     HDL Cholesterol (mg/dL)   Date Value   12/22/2022 55     LDL Cholesterol (mg/dL)   Date Value   12/22/2022 63     Triglycerides (mg/dL)   Date Value   12/22/2022 113   06/24/2021 120     Lab Results   Component Value Date     06/24/2021    A1C 4.9 12/22/2022     Lab Results   Component Value Date    TSH 3.860 (H) 06/24/2021       No follow-ups on file.

## 2023-06-28 ENCOUNTER — OFFICE VISIT (OUTPATIENT)
Dept: FAMILY MEDICINE CLINIC | Facility: CLINIC | Age: 58
End: 2023-06-28
Payer: COMMERCIAL

## 2023-06-28 VITALS
BODY MASS INDEX: 38.95 KG/M2 | TEMPERATURE: 97 F | SYSTOLIC BLOOD PRESSURE: 126 MMHG | WEIGHT: 219.81 LBS | HEART RATE: 85 BPM | OXYGEN SATURATION: 100 % | RESPIRATION RATE: 18 BRPM | DIASTOLIC BLOOD PRESSURE: 82 MMHG | HEIGHT: 63 IN

## 2023-06-28 DIAGNOSIS — I10 BENIGN ESSENTIAL HYPERTENSION: ICD-10-CM

## 2023-06-28 DIAGNOSIS — E66.01 CLASS 2 SEVERE OBESITY DUE TO EXCESS CALORIES WITH SERIOUS COMORBIDITY AND BODY MASS INDEX (BMI) OF 38.0 TO 38.9 IN ADULT (HCC): ICD-10-CM

## 2023-06-28 DIAGNOSIS — K59.1 FUNCTIONAL DIARRHEA: ICD-10-CM

## 2023-06-28 DIAGNOSIS — E11.9 TYPE 2 DIABETES MELLITUS WITHOUT COMPLICATION, WITHOUT LONG-TERM CURRENT USE OF INSULIN (HCC): Primary | ICD-10-CM

## 2023-06-28 DIAGNOSIS — Z86.718 HISTORY OF DEEP VENOUS THROMBOSIS (DVT) OF DISTAL VEIN OF LEFT LOWER EXTREMITY: ICD-10-CM

## 2023-06-28 LAB — HEMOGLOBIN A1C: 5 % (ref 4.3–5.6)

## 2023-06-28 PROCEDURE — 3008F BODY MASS INDEX DOCD: CPT | Performed by: FAMILY MEDICINE

## 2023-06-28 PROCEDURE — 99214 OFFICE O/P EST MOD 30 MIN: CPT | Performed by: FAMILY MEDICINE

## 2023-06-28 PROCEDURE — 3044F HG A1C LEVEL LT 7.0%: CPT | Performed by: FAMILY MEDICINE

## 2023-06-28 PROCEDURE — 3074F SYST BP LT 130 MM HG: CPT | Performed by: FAMILY MEDICINE

## 2023-06-28 PROCEDURE — 83036 HEMOGLOBIN GLYCOSYLATED A1C: CPT | Performed by: FAMILY MEDICINE

## 2023-06-28 PROCEDURE — 3079F DIAST BP 80-89 MM HG: CPT | Performed by: FAMILY MEDICINE

## 2023-06-28 RX ORDER — DULAGLUTIDE 0.75 MG/.5ML
0.75 INJECTION, SOLUTION SUBCUTANEOUS WEEKLY
Qty: 12 EACH | Refills: 3 | Status: SHIPPED | OUTPATIENT
Start: 2023-06-28

## 2024-04-11 ENCOUNTER — ORDER TRANSCRIPTION (OUTPATIENT)
Dept: SLEEP CENTER | Age: 59
End: 2024-04-11

## 2024-04-11 ENCOUNTER — OFFICE VISIT (OUTPATIENT)
Dept: SLEEP CENTER | Age: 59
End: 2024-04-11
Attending: FAMILY MEDICINE
Payer: COMMERCIAL

## 2024-04-11 DIAGNOSIS — R06.02 SOB (SHORTNESS OF BREATH): ICD-10-CM

## 2024-04-11 DIAGNOSIS — G47.19 EXCESSIVE DAYTIME SLEEPINESS: ICD-10-CM

## 2024-04-11 DIAGNOSIS — I10 HTN (HYPERTENSION): ICD-10-CM

## 2024-04-11 DIAGNOSIS — I10 HTN (HYPERTENSION): Primary | ICD-10-CM

## 2024-04-11 DIAGNOSIS — J91.0 PLEURAL EFFUSION, MALIGNANT (HCC): ICD-10-CM

## 2024-04-11 DIAGNOSIS — E11.9 DM (DIABETES MELLITUS) (HCC): ICD-10-CM

## 2024-04-11 PROCEDURE — 95810 POLYSOM 6/> YRS 4/> PARAM: CPT

## 2025-01-17 NOTE — PROGRESS NOTES
Care Management Follow Up    Length of Stay (days): 9    Expected Discharge Date: 01/21/2025     Concerns to be Addressed: other (see comments) (elevated risk)     Patient plan of care discussed at interdisciplinary rounds: Yes    Anticipated Discharge Disposition: Home              Anticipated Discharge Services: None  Anticipated Discharge DME: None    Patient/family educated on Medicare website which has current facility and service quality ratings: no  Education Provided on the Discharge Plan: No  Patient/Family in Agreement with the Plan: yes    Referrals Placed by CM/SW:    Private pay costs discussed: Not applicable    Discussed  Partnership in Safe Discharge Planning  document with patient/family: No     Handoff Completed: Yes, [facility] PCP: Internal handoff referral completed    Additional Information:  Care Transitions continues to follow for discharge planning. Physical therapy has been consulted will follow ongoing recommendations.      Jackie Ribera RN, BSN, ACM   Care Transitions Specialist  [facility]  Care Transitions Specialist  [address] [place]. [address]  [email or web address]  Office: [phone]   Fax: [phone]  [facility]          [facility] [place]  PROGRESS NOTE  Chief Complaint:   Patient presents with:  Physical: Pap not Due      HPI:   This is a 55year old female coming in for her annual wellness visit. She has widely metastatic breast cancer.   She has known mets Date       Past Medical History:   Diagnosis Date   • Cancer [facility]) 2007    breast cancer- L side   • Diabetes ([facility] [address] 2007   • Essential hypertension    • History of deep venous thrombosis (DVT) of distal vein of left lower extremity 7/25/2018   • Malignant METFORMIN HCL 1000 MG Oral Tab TAKE 1 TABLET TWICE A DAY WITH MEALS 180 tablet 1   • TRULICITY 1.5 OS/0.1WC Subcutaneous Solution Pen-injector INJECT 0.5 ML INTO THE SKIN ONCE A WEEK 6 mL 1   • fulvestrant 250 MG/5ML Intramuscular injection Inject 1 Dose i Nose, Throat:  Denies hearing loss, sneezing, congestion, runny nose or sore throat. INTEGUMENTARY: She has a history of psoriasis but things have been relatively stable recently.   CARDIOVASCULAR:  Denies chest pain, chest pressure, chest discomfort, palp Supple, no CLAD, no JVD, no thyromegaly. SKIN: No rashes, no skin lesion, no bruising, good turgor. HEART:  Regular rate and rhythm, no murmurs, rubs or gallops. LUNGS: Clear to auscultation bilterally, no rales/rhonchi/wheezing.   Breast exam: Right freddie adequate. No new treatment recommended. 5. History of deep venous thrombosis (DVT) of distal vein of left lower extremity  She has a past history of a DVT. She does not have any signs of a clot now.     6. Malignant neoplasm metastatic to right lung (H metastatic. Exam today does not show any evidence of local recurrence in the left chest wall area. 20. 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 No new changes now.     21. Elevated tumor markers  Her tumor ma treatments as a result of today.      Problem List:  Patient Active Problem List:     Obesity     Malignant neoplasm of upper-outer quadrant of female breast ([facility] [address]     Benign essential hypertension     Eczema     Type 2 diabetes mellitus without complicatio

## (undated) NOTE — MR AVS SNAPSHOT
[address]  [address] [id number] [phone]               Thank you for choosing us for your health care visit with Sridevi Garcia MD.  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 Commonly known as:  GLUCOPHAGE           ONETOUCH ULTRA 2 w/Device Kit           ONETOUCH ULTRA BLUE Strp   Generic drug:  Glucose Blood   2 each by Finger stick route daily.            PRANDIN 0.5 MG

## (undated) NOTE — LETTER
1/26/2018              [place] [id number]         To Whom It May Concern,    Wallace Sanches had a preop evaluation done at clinic yesterday.   Chest x-ray revealed that patient has right pl

## (undated) NOTE — LETTER
02/10/21        Jesus Wiseman  [address] [facility]      Dear Charu Marte,    [facility] records indicate that you have outstanding lab work and or testing that was ordered for you and has not yet been completed:  Orders Placed This [place]

## (undated) NOTE — MR AVS SNAPSHOT
[address]  [address] [phone]  [phone]               Thank you for choosing us for your health care visit with CECILIO Joseph.   We are glad to serve you and happy to provide you with this summary o ONETOUCH ULTRA 2 w/Device Kit           ONETOUCH ULTRA BLUE Strp   Generic drug:  Glucose Blood           PRANDIN 0.5 MG Tabs   Generic drug:  Repaglinide           Tamoxifen Citrate 20 MG Tabs   Commonly known as:  NOLVADEX           VICTOZA 18 MG/3ML So including white bread, rice and pasta   Eat plenty of protein, keep the fat content low Sugars:  sodas and sports drinks, candies and desserts   Eat plenty of low-fat dairy products High fat meats and dairy   Choose whole grain products Foods high in sodiu

## (undated) NOTE — MR AVS SNAPSHOT
[address]  [address] [phone]               Thank you for choosing us for your health care visit with CECILIO Landrum.   We are glad to serve you and happy to provide you with this summary o Take 1 capsule (200 mg total) by mouth 3 (three) times daily as needed for cough.    Commonly known as:  TESSALON           lisinopril 10 MG Tabs   Commonly known as:  PRINIVIL,ZESTRIL           MetFORMIN HCl 1000 MG Tabs   Commonly known as:  GLUCOPHAGE

## (undated) NOTE — LETTER
01/17/19        [address] [facility]      Dear Sandy Mckay,    [facility] records indicate that you have outstanding lab work and or testing that was ordered for you and has not yet been completed:  Orders Placed This [place]

## (undated) NOTE — LETTER
2/2/2018              Luz Peck [id number]         To Whom It May Concern,    Enedelia Chen was evaluated at the clinic for preop and was also seen by pulmonologist who has cleared her for surge